# Patient Record
Sex: MALE
[De-identification: names, ages, dates, MRNs, and addresses within clinical notes are randomized per-mention and may not be internally consistent; named-entity substitution may affect disease eponyms.]

---

## 2017-01-31 ENCOUNTER — RESULT REVIEW (OUTPATIENT)
Age: 62
End: 2017-01-31

## 2017-11-10 ENCOUNTER — APPOINTMENT (OUTPATIENT)
Dept: NEUROLOGY | Facility: CLINIC | Age: 62
End: 2017-11-10

## 2017-11-13 ENCOUNTER — FORM ENCOUNTER (OUTPATIENT)
Age: 62
End: 2017-11-13

## 2017-11-14 ENCOUNTER — APPOINTMENT (OUTPATIENT)
Dept: NEUROLOGY | Facility: CLINIC | Age: 62
End: 2017-11-14
Payer: COMMERCIAL

## 2017-11-14 ENCOUNTER — APPOINTMENT (OUTPATIENT)
Dept: CT IMAGING | Facility: CLINIC | Age: 62
End: 2017-11-14
Payer: COMMERCIAL

## 2017-11-14 ENCOUNTER — OUTPATIENT (OUTPATIENT)
Dept: OUTPATIENT SERVICES | Facility: HOSPITAL | Age: 62
LOS: 1 days | End: 2017-11-14
Payer: COMMERCIAL

## 2017-11-14 VITALS — HEIGHT: 69 IN

## 2017-11-14 DIAGNOSIS — Z80.3 FAMILY HISTORY OF MALIGNANT NEOPLASM OF BREAST: ICD-10-CM

## 2017-11-14 DIAGNOSIS — Z95.2 PRESENCE OF PROSTHETIC HEART VALVE: Chronic | ICD-10-CM

## 2017-11-14 DIAGNOSIS — Z78.9 OTHER SPECIFIED HEALTH STATUS: ICD-10-CM

## 2017-11-14 DIAGNOSIS — Z96.659 PRESENCE OF UNSPECIFIED ARTIFICIAL KNEE JOINT: Chronic | ICD-10-CM

## 2017-11-14 DIAGNOSIS — I65.21 OCCLUSION AND STENOSIS OF RIGHT CAROTID ARTERY: ICD-10-CM

## 2017-11-14 DIAGNOSIS — Z80.41 FAMILY HISTORY OF MALIGNANT NEOPLASM OF OVARY: ICD-10-CM

## 2017-11-14 DIAGNOSIS — Z81.8 FAMILY HISTORY OF OTHER MENTAL AND BEHAVIORAL DISORDERS: ICD-10-CM

## 2017-11-14 DIAGNOSIS — I25.10 ATHEROSCLEROTIC HEART DISEASE OF NATIVE CORONARY ARTERY WITHOUT ANGINA PECTORIS: Chronic | ICD-10-CM

## 2017-11-14 DIAGNOSIS — Z82.49 FAMILY HISTORY OF ISCHEMIC HEART DISEASE AND OTHER DISEASES OF THE CIRCULATORY SYSTEM: ICD-10-CM

## 2017-11-14 PROCEDURE — 70498 CT ANGIOGRAPHY NECK: CPT | Mod: 26

## 2017-11-14 PROCEDURE — 82565 ASSAY OF CREATININE: CPT

## 2017-11-14 PROCEDURE — 70496 CT ANGIOGRAPHY HEAD: CPT | Mod: 26

## 2017-11-14 PROCEDURE — 70496 CT ANGIOGRAPHY HEAD: CPT

## 2017-11-14 PROCEDURE — 70498 CT ANGIOGRAPHY NECK: CPT

## 2017-11-14 PROCEDURE — 99245 OFF/OP CONSLTJ NEW/EST HI 55: CPT

## 2017-11-14 RX ORDER — VALSARTAN AND HYDROCHLOROTHIAZIDE 160; 12.5 MG/1; MG/1
160-12.5 TABLET, FILM COATED ORAL
Qty: 90 | Refills: 0 | Status: DISCONTINUED | COMMUNITY
Start: 2017-05-24 | End: 2017-11-14

## 2017-11-14 RX ORDER — ALPRAZOLAM 0.5 MG/1
0.5 TABLET ORAL
Qty: 120 | Refills: 0 | Status: DISCONTINUED | COMMUNITY
Start: 2017-07-13 | End: 2017-11-14

## 2017-11-14 RX ORDER — PANTOPRAZOLE 40 MG/1
40 TABLET, DELAYED RELEASE ORAL
Qty: 30 | Refills: 0 | Status: DISCONTINUED | COMMUNITY
Start: 2017-10-05 | End: 2017-11-14

## 2017-11-14 RX ORDER — CLINDAMYCIN HYDROCHLORIDE 300 MG/1
300 CAPSULE ORAL
Qty: 28 | Refills: 0 | Status: DISCONTINUED | COMMUNITY
Start: 2017-10-20 | End: 2017-11-14

## 2017-11-14 RX ORDER — SILDENAFIL CITRATE 100 MG/1
100 TABLET, FILM COATED ORAL
Qty: 6 | Refills: 0 | Status: ACTIVE | COMMUNITY
Start: 2017-09-06

## 2017-11-21 ENCOUNTER — APPOINTMENT (OUTPATIENT)
Dept: VASCULAR SURGERY | Facility: CLINIC | Age: 62
End: 2017-11-21
Payer: COMMERCIAL

## 2017-11-21 VITALS
HEART RATE: 68 BPM | TEMPERATURE: 98 F | SYSTOLIC BLOOD PRESSURE: 131 MMHG | BODY MASS INDEX: 35.55 KG/M2 | WEIGHT: 240 LBS | DIASTOLIC BLOOD PRESSURE: 88 MMHG | HEIGHT: 69 IN

## 2017-11-21 DIAGNOSIS — Z86.69 PERSONAL HISTORY OF OTHER DISEASES OF THE NERVOUS SYSTEM AND SENSE ORGANS: ICD-10-CM

## 2017-11-21 DIAGNOSIS — Z82.49 FAMILY HISTORY OF ISCHEMIC HEART DISEASE AND OTHER DISEASES OF THE CIRCULATORY SYSTEM: ICD-10-CM

## 2017-11-21 DIAGNOSIS — Z86.79 PERSONAL HISTORY OF OTHER DISEASES OF THE CIRCULATORY SYSTEM: ICD-10-CM

## 2017-11-21 DIAGNOSIS — Z80.8 FAMILY HISTORY OF MALIGNANT NEOPLASM OF OTHER ORGANS OR SYSTEMS: ICD-10-CM

## 2017-11-21 DIAGNOSIS — E78.00 PURE HYPERCHOLESTEROLEMIA, UNSPECIFIED: ICD-10-CM

## 2017-11-21 DIAGNOSIS — Z87.891 PERSONAL HISTORY OF NICOTINE DEPENDENCE: ICD-10-CM

## 2017-11-21 PROCEDURE — 99202 OFFICE O/P NEW SF 15 MIN: CPT | Mod: 25

## 2017-11-30 ENCOUNTER — APPOINTMENT (OUTPATIENT)
Dept: NEUROLOGY | Facility: CLINIC | Age: 62
End: 2017-11-30
Payer: COMMERCIAL

## 2017-11-30 PROCEDURE — 95937 NEUROMUSCULAR JUNCTION TEST: CPT

## 2017-11-30 PROCEDURE — 95933 BLINK REFLEX TEST: CPT

## 2017-11-30 PROCEDURE — 95909 NRV CNDJ TST 5-6 STUDIES: CPT

## 2017-12-01 ENCOUNTER — OUTPATIENT (OUTPATIENT)
Dept: OUTPATIENT SERVICES | Facility: HOSPITAL | Age: 62
LOS: 1 days | End: 2017-12-01
Payer: COMMERCIAL

## 2017-12-01 VITALS
DIASTOLIC BLOOD PRESSURE: 83 MMHG | WEIGHT: 246.92 LBS | SYSTOLIC BLOOD PRESSURE: 124 MMHG | TEMPERATURE: 98 F | HEART RATE: 71 BPM | RESPIRATION RATE: 16 BRPM | HEIGHT: 69 IN | OXYGEN SATURATION: 96 %

## 2017-12-01 DIAGNOSIS — I65.22 OCCLUSION AND STENOSIS OF LEFT CAROTID ARTERY: ICD-10-CM

## 2017-12-01 DIAGNOSIS — Z01.818 ENCOUNTER FOR OTHER PREPROCEDURAL EXAMINATION: ICD-10-CM

## 2017-12-01 DIAGNOSIS — I10 ESSENTIAL (PRIMARY) HYPERTENSION: ICD-10-CM

## 2017-12-01 DIAGNOSIS — Z96.659 PRESENCE OF UNSPECIFIED ARTIFICIAL KNEE JOINT: Chronic | ICD-10-CM

## 2017-12-01 DIAGNOSIS — G47.33 OBSTRUCTIVE SLEEP APNEA (ADULT) (PEDIATRIC): ICD-10-CM

## 2017-12-01 DIAGNOSIS — I65.21 OCCLUSION AND STENOSIS OF RIGHT CAROTID ARTERY: ICD-10-CM

## 2017-12-01 DIAGNOSIS — I25.10 ATHEROSCLEROTIC HEART DISEASE OF NATIVE CORONARY ARTERY WITHOUT ANGINA PECTORIS: Chronic | ICD-10-CM

## 2017-12-01 DIAGNOSIS — Z95.2 PRESENCE OF PROSTHETIC HEART VALVE: Chronic | ICD-10-CM

## 2017-12-01 LAB
ANION GAP SERPL CALC-SCNC: 18 MMOL/L — HIGH (ref 5–17)
BLD GP AB SCN SERPL QL: NEGATIVE — SIGNIFICANT CHANGE UP
BUN SERPL-MCNC: 16 MG/DL — SIGNIFICANT CHANGE UP (ref 7–23)
CALCIUM SERPL-MCNC: 10.1 MG/DL — SIGNIFICANT CHANGE UP (ref 8.4–10.5)
CHLORIDE SERPL-SCNC: 99 MMOL/L — SIGNIFICANT CHANGE UP (ref 96–108)
CO2 SERPL-SCNC: 24 MMOL/L — SIGNIFICANT CHANGE UP (ref 22–31)
CREAT SERPL-MCNC: 0.82 MG/DL — SIGNIFICANT CHANGE UP (ref 0.5–1.3)
GLUCOSE SERPL-MCNC: 100 MG/DL — HIGH (ref 70–99)
HCT VFR BLD CALC: 42.8 % — SIGNIFICANT CHANGE UP (ref 39–50)
HGB BLD-MCNC: 15 G/DL — SIGNIFICANT CHANGE UP (ref 13–17)
MCHC RBC-ENTMCNC: 33.6 PG — SIGNIFICANT CHANGE UP (ref 27–34)
MCHC RBC-ENTMCNC: 35 GM/DL — SIGNIFICANT CHANGE UP (ref 32–36)
MCV RBC AUTO: 95.7 FL — SIGNIFICANT CHANGE UP (ref 80–100)
PLATELET # BLD AUTO: 182 K/UL — SIGNIFICANT CHANGE UP (ref 150–400)
POTASSIUM SERPL-MCNC: 4.5 MMOL/L — SIGNIFICANT CHANGE UP (ref 3.5–5.3)
POTASSIUM SERPL-SCNC: 4.5 MMOL/L — SIGNIFICANT CHANGE UP (ref 3.5–5.3)
RBC # BLD: 4.47 M/UL — SIGNIFICANT CHANGE UP (ref 4.2–5.8)
RBC # FLD: 12.9 % — SIGNIFICANT CHANGE UP (ref 10.3–14.5)
RH IG SCN BLD-IMP: POSITIVE — SIGNIFICANT CHANGE UP
SODIUM SERPL-SCNC: 141 MMOL/L — SIGNIFICANT CHANGE UP (ref 135–145)
WBC # BLD: 7.1 K/UL — SIGNIFICANT CHANGE UP (ref 3.8–10.5)
WBC # FLD AUTO: 7.1 K/UL — SIGNIFICANT CHANGE UP (ref 3.8–10.5)

## 2017-12-01 PROCEDURE — 85027 COMPLETE CBC AUTOMATED: CPT

## 2017-12-01 PROCEDURE — G0463: CPT

## 2017-12-01 PROCEDURE — 80048 BASIC METABOLIC PNL TOTAL CA: CPT

## 2017-12-01 PROCEDURE — 86900 BLOOD TYPING SEROLOGIC ABO: CPT

## 2017-12-01 PROCEDURE — 86850 RBC ANTIBODY SCREEN: CPT

## 2017-12-01 PROCEDURE — 86901 BLOOD TYPING SEROLOGIC RH(D): CPT

## 2017-12-01 RX ORDER — VANCOMYCIN HCL 1 G
1500 VIAL (EA) INTRAVENOUS ONCE
Qty: 0 | Refills: 0 | Status: DISCONTINUED | OUTPATIENT
Start: 2017-12-07 | End: 2017-12-08

## 2017-12-01 RX ORDER — LIDOCAINE HCL 20 MG/ML
0.2 VIAL (ML) INJECTION ONCE
Qty: 0 | Refills: 0 | Status: DISCONTINUED | OUTPATIENT
Start: 2017-12-07 | End: 2017-12-08

## 2017-12-01 NOTE — H&P PST ADULT - HISTORY OF PRESENT ILLNESS
61 yo male, PMH CAD s/p CABG X 3 2009, aortic stenosis s/p AVR with Bovine valve 2013, a-fib with cardiac ablation 2016 on Eliquis and aspirin. Pt. reports having lightheadedness and double vision, had MRA which revealed right carotid stenosis. Pt. presents to PST today for Right Carotid Endarterectomy on 12/7/17. Pt. denies syncope, recent fever, chills, chest pain, SOB, changes in bowel/urinary habits.  Pt. saw cardiologist on 11/27/17 for cardiac evaluation, he will hold Eliquis 2 days pre-op, last dose 12/4/17 and continue aspirin during blair-op period.  EKG, Echo, cardiac cath from this year in chart

## 2017-12-01 NOTE — H&P PST ADULT - LAST CARDIAC ANGIOGRAM/IMAGING
10/5/17 non-obstructive LAD disease with atretic LIMA to LAD, previously known RCA occlusion with patent SVG to RPDA, patent SVG to OM - report in chart

## 2017-12-01 NOTE — H&P PST ADULT - PMH
Gout    Hyperlipidemia    Hypertension A-fib  s/p cardiac ablation 3/2016  Aortic stenosis  s/p AVR - Bovine 2013  CAD (coronary artery disease)  s/p CABG x 3 2009 at [facility]  Gout    Hyperlipidemia    Hypertension    Obesity  BMI 36.5 on 12/1/17  Stenosis of right carotid artery

## 2017-12-01 NOTE — H&P PST ADULT - PROBLEM SELECTOR PLAN 1
Right Carotid Endarterectomy  Pre-op education, including Chlorhexidine soap, provided - all questions answered  Pt. will hold Eliquis 2 days pre-op and continue aspirin during blair-op period

## 2017-12-01 NOTE — H&P PST ADULT - ATTENDING COMMENTS
Patient has  severe  80% asymptomatic right  carotid stenosisi  right  CEA discussed  risks benefits  discussed  Patient  wants  this  procedure   performed

## 2017-12-01 NOTE — H&P PST ADULT - OTHER CARE PROVIDERS
Dr. Reyes, cardiology, [phone], Dr. Tovar EPS, [phone], Dr. Coleman,  , Dr. Jake Gonsales, ortho, [phone]

## 2017-12-01 NOTE — H&P PST ADULT - PSH
3-vessel coronary artery disease  CBGE  H/O aortic valve replacement    S/P TKR (total knee replacement) 3-vessel coronary artery disease  CABG x 3 on 8/2009  H/O aortic valve replacement  bovine, 9/26/2013  S/P TKR (total knee replacement)  right, 8/2011

## 2017-12-01 NOTE — H&P PST ADULT - ACTIVITY
Gym, cardio, weights, for one hour - 3 times a week Gym at least 3 times a week: cardio, weights for one hour

## 2017-12-07 ENCOUNTER — INPATIENT (INPATIENT)
Facility: HOSPITAL | Age: 62
LOS: 0 days | Discharge: HOME | DRG: 39 | End: 2017-12-08
Attending: SURGERY | Admitting: SURGERY
Payer: COMMERCIAL

## 2017-12-07 ENCOUNTER — APPOINTMENT (OUTPATIENT)
Dept: VASCULAR SURGERY | Facility: HOSPITAL | Age: 62
End: 2017-12-07

## 2017-12-07 ENCOUNTER — RESULT REVIEW (OUTPATIENT)
Age: 62
End: 2017-12-07

## 2017-12-07 ENCOUNTER — TRANSCRIPTION ENCOUNTER (OUTPATIENT)
Age: 62
End: 2017-12-07

## 2017-12-07 VITALS
RESPIRATION RATE: 18 BRPM | HEART RATE: 65 BPM | OXYGEN SATURATION: 96 % | HEIGHT: 69 IN | TEMPERATURE: 98 F | SYSTOLIC BLOOD PRESSURE: 119 MMHG | DIASTOLIC BLOOD PRESSURE: 81 MMHG | WEIGHT: 246.92 LBS

## 2017-12-07 DIAGNOSIS — I65.22 OCCLUSION AND STENOSIS OF LEFT CAROTID ARTERY: ICD-10-CM

## 2017-12-07 DIAGNOSIS — I65.21 OCCLUSION AND STENOSIS OF RIGHT CAROTID ARTERY: ICD-10-CM

## 2017-12-07 DIAGNOSIS — Z96.659 PRESENCE OF UNSPECIFIED ARTIFICIAL KNEE JOINT: Chronic | ICD-10-CM

## 2017-12-07 DIAGNOSIS — Z95.2 PRESENCE OF PROSTHETIC HEART VALVE: Chronic | ICD-10-CM

## 2017-12-07 DIAGNOSIS — I25.10 ATHEROSCLEROTIC HEART DISEASE OF NATIVE CORONARY ARTERY WITHOUT ANGINA PECTORIS: Chronic | ICD-10-CM

## 2017-12-07 LAB — RH IG SCN BLD-IMP: POSITIVE — SIGNIFICANT CHANGE UP

## 2017-12-07 PROCEDURE — 88305 TISSUE EXAM BY PATHOLOGIST: CPT | Mod: 26

## 2017-12-07 PROCEDURE — 35301 RECHANNELING OF ARTERY: CPT | Mod: GC

## 2017-12-07 PROCEDURE — 88311 DECALCIFY TISSUE: CPT | Mod: 26

## 2017-12-07 PROCEDURE — 88304 TISSUE EXAM BY PATHOLOGIST: CPT | Mod: 26

## 2017-12-07 RX ORDER — HYDROMORPHONE HYDROCHLORIDE 2 MG/ML
0.5 INJECTION INTRAMUSCULAR; INTRAVENOUS; SUBCUTANEOUS
Qty: 0 | Refills: 0 | Status: DISCONTINUED | OUTPATIENT
Start: 2017-12-07 | End: 2017-12-08

## 2017-12-07 RX ORDER — SODIUM CHLORIDE 9 MG/ML
500 INJECTION, SOLUTION INTRAVENOUS ONCE
Qty: 0 | Refills: 0 | Status: COMPLETED | OUTPATIENT
Start: 2017-12-07 | End: 2017-12-07

## 2017-12-07 RX ORDER — METOPROLOL TARTRATE 50 MG
150 TABLET ORAL DAILY
Qty: 0 | Refills: 0 | Status: DISCONTINUED | OUTPATIENT
Start: 2017-12-07 | End: 2017-12-08

## 2017-12-07 RX ORDER — DEXTROSE MONOHYDRATE, SODIUM CHLORIDE, AND POTASSIUM CHLORIDE 50; .745; 4.5 G/1000ML; G/1000ML; G/1000ML
1000 INJECTION, SOLUTION INTRAVENOUS
Qty: 0 | Refills: 0 | Status: DISCONTINUED | OUTPATIENT
Start: 2017-12-07 | End: 2017-12-08

## 2017-12-07 RX ORDER — HEPARIN SODIUM 5000 [USP'U]/ML
5000 INJECTION INTRAVENOUS; SUBCUTANEOUS EVERY 8 HOURS
Qty: 0 | Refills: 0 | Status: DISCONTINUED | OUTPATIENT
Start: 2017-12-07 | End: 2017-12-08

## 2017-12-07 RX ORDER — OXYCODONE AND ACETAMINOPHEN 5; 325 MG/1; MG/1
2 TABLET ORAL EVERY 6 HOURS
Qty: 0 | Refills: 0 | Status: DISCONTINUED | OUTPATIENT
Start: 2017-12-07 | End: 2017-12-08

## 2017-12-07 RX ORDER — PHENYLEPHRINE HYDROCHLORIDE 10 MG/ML
0.5 INJECTION INTRAVENOUS
Qty: 80 | Refills: 0 | Status: DISCONTINUED | OUTPATIENT
Start: 2017-12-07 | End: 2017-12-08

## 2017-12-07 RX ORDER — ACETAMINOPHEN 500 MG
1000 TABLET ORAL ONCE
Qty: 0 | Refills: 0 | Status: COMPLETED | OUTPATIENT
Start: 2017-12-07 | End: 2017-12-07

## 2017-12-07 RX ORDER — ONDANSETRON 8 MG/1
4 TABLET, FILM COATED ORAL ONCE
Qty: 0 | Refills: 0 | Status: DISCONTINUED | OUTPATIENT
Start: 2017-12-07 | End: 2017-12-08

## 2017-12-07 RX ORDER — ALLOPURINOL 300 MG
300 TABLET ORAL DAILY
Qty: 0 | Refills: 0 | Status: DISCONTINUED | OUTPATIENT
Start: 2017-12-07 | End: 2017-12-08

## 2017-12-07 RX ORDER — METOPROLOL TARTRATE 50 MG
150 TABLET ORAL DAILY
Qty: 0 | Refills: 0 | Status: DISCONTINUED | OUTPATIENT
Start: 2017-12-07 | End: 2017-12-07

## 2017-12-07 RX ORDER — ATORVASTATIN CALCIUM 80 MG/1
20 TABLET, FILM COATED ORAL AT BEDTIME
Qty: 0 | Refills: 0 | Status: DISCONTINUED | OUTPATIENT
Start: 2017-12-07 | End: 2017-12-08

## 2017-12-07 RX ORDER — SODIUM CHLORIDE 9 MG/ML
3 INJECTION INTRAMUSCULAR; INTRAVENOUS; SUBCUTANEOUS EVERY 8 HOURS
Qty: 0 | Refills: 0 | Status: DISCONTINUED | OUTPATIENT
Start: 2017-12-07 | End: 2017-12-07

## 2017-12-07 RX ORDER — ASPIRIN/CALCIUM CARB/MAGNESIUM 324 MG
81 TABLET ORAL DAILY
Qty: 0 | Refills: 0 | Status: DISCONTINUED | OUTPATIENT
Start: 2017-12-08 | End: 2017-12-08

## 2017-12-07 RX ORDER — ASPIRIN/CALCIUM CARB/MAGNESIUM 324 MG
81 TABLET ORAL DAILY
Qty: 0 | Refills: 0 | Status: DISCONTINUED | OUTPATIENT
Start: 2017-12-07 | End: 2017-12-07

## 2017-12-07 RX ADMIN — HEPARIN SODIUM 5000 UNIT(S): 5000 INJECTION INTRAVENOUS; SUBCUTANEOUS at 22:36

## 2017-12-07 RX ADMIN — SODIUM CHLORIDE 1000 MILLILITER(S): 9 INJECTION, SOLUTION INTRAVENOUS at 17:46

## 2017-12-07 RX ADMIN — ATORVASTATIN CALCIUM 20 MILLIGRAM(S): 80 TABLET, FILM COATED ORAL at 22:36

## 2017-12-07 RX ADMIN — PHENYLEPHRINE HYDROCHLORIDE 21 MICROGRAM(S)/KG/MIN: 10 INJECTION INTRAVENOUS at 18:51

## 2017-12-07 RX ADMIN — HYDROMORPHONE HYDROCHLORIDE 0.5 MILLIGRAM(S): 2 INJECTION INTRAMUSCULAR; INTRAVENOUS; SUBCUTANEOUS at 21:01

## 2017-12-07 RX ADMIN — DEXTROSE MONOHYDRATE, SODIUM CHLORIDE, AND POTASSIUM CHLORIDE 75 MILLILITER(S): 50; .745; 4.5 INJECTION, SOLUTION INTRAVENOUS at 17:47

## 2017-12-07 RX ADMIN — SODIUM CHLORIDE 3 MILLILITER(S): 9 INJECTION INTRAMUSCULAR; INTRAVENOUS; SUBCUTANEOUS at 10:53

## 2017-12-07 RX ADMIN — HYDROMORPHONE HYDROCHLORIDE 0.5 MILLIGRAM(S): 2 INJECTION INTRAMUSCULAR; INTRAVENOUS; SUBCUTANEOUS at 20:58

## 2017-12-07 NOTE — BRIEF OPERATIVE NOTE - PROCEDURE
<<-----Click on this checkbox to enter Procedure Carotid endarterectomy  12/07/2017  Right  Active  [id number]

## 2017-12-07 NOTE — PROGRESS NOTE ADULT - ASSESSMENT
62y Male PMH carotid stenosis. s/p R Carotid endarterectomy, currently requiring Nik for decreased BP.    - Pain control  - Wean Nik as tolerated to goal SBP>110  - Re-eval at [facility]  - ASA, DVT ppx  - Continue CLD

## 2017-12-07 NOTE — PATIENT PROFILE ADULT. - PSH
3-vessel coronary artery disease  CABG x 3 on 8/2009  H/O aortic valve replacement  bovine, 9/26/2013  S/P TKR (total knee replacement)  right, 8/2011

## 2017-12-07 NOTE — PROGRESS NOTE ADULT - SUBJECTIVE AND OBJECTIVE BOX
Under GA right CEA with Vein Patch  under neuro monitoring  done No changes  No shunt  needed  Post extubation  moving  all 4 extremities following commands  Patient had  a severe  right  ICA asymptomatic  stenosis  CTA was done preop

## 2017-12-07 NOTE — PROGRESS NOTE ADULT - SUBJECTIVE AND OBJECTIVE BOX
Post-operative note    S: Patient underwent R CEA, tolerated procedure without  issue and sent to PACU.  Patient SBP slightly soft in PACU, running between 100-110. Patient asymptomatic, denies pain, lightheadedness, dizziness. Patient received 500cc LR bolus, with minimal increase in SBP. Patient started on Nik drip. At time of POC, pt on 0.238 Nik.    O:T(C): 36.5 (12-07-17 @ 16:10), Max: 36.5 (12-07-17 @ 16:10)  HR: 54 (12-07-17 @ 20:00) (45 - 67)  BP: 118/58 (12-07-17 @ 20:00) (95/51 - 122/63)  RR: 16 (12-07-17 @ 20:00) (12 - 16)  SpO2: 100% (12-07-17 @ 20:00) (92% - 100%)  Wt(kg): --             Gen: NAD  HEENT: Neck incision c/d/i. Soft, no palpable hematoma. No visible drainage.  Abd: Soft, nontender, nondistended  Ext: LLE vein harvest site c/d/i

## 2017-12-07 NOTE — PATIENT PROFILE ADULT. - PMH
A-fib  s/p cardiac ablation 3/2016  Aortic stenosis  s/p AVR - Bovine 2013  CAD (coronary artery disease)  s/p CABG x 3 2009 at [facility]  Gout    Hyperlipidemia    Hypertension    Obesity  BMI 36.5 on 12/1/17  Stenosis of right carotid artery

## 2017-12-08 ENCOUNTER — TRANSCRIPTION ENCOUNTER (OUTPATIENT)
Age: 62
End: 2017-12-08

## 2017-12-08 VITALS
TEMPERATURE: 98 F | OXYGEN SATURATION: 94 % | SYSTOLIC BLOOD PRESSURE: 101 MMHG | DIASTOLIC BLOOD PRESSURE: 65 MMHG | RESPIRATION RATE: 18 BRPM | HEART RATE: 73 BPM

## 2017-12-08 LAB
ANION GAP SERPL CALC-SCNC: 11 MMOL/L — SIGNIFICANT CHANGE UP (ref 5–17)
BUN SERPL-MCNC: 12 MG/DL — SIGNIFICANT CHANGE UP (ref 7–23)
CALCIUM SERPL-MCNC: 8.5 MG/DL — SIGNIFICANT CHANGE UP (ref 8.4–10.5)
CHLORIDE SERPL-SCNC: 100 MMOL/L — SIGNIFICANT CHANGE UP (ref 96–108)
CO2 SERPL-SCNC: 26 MMOL/L — SIGNIFICANT CHANGE UP (ref 22–31)
CREAT SERPL-MCNC: 0.75 MG/DL — SIGNIFICANT CHANGE UP (ref 0.5–1.3)
GLUCOSE SERPL-MCNC: 110 MG/DL — HIGH (ref 70–99)
HCT VFR BLD CALC: 40.5 % — SIGNIFICANT CHANGE UP (ref 39–50)
HGB BLD-MCNC: 14.4 G/DL — SIGNIFICANT CHANGE UP (ref 13–17)
MAGNESIUM SERPL-MCNC: 1.9 MG/DL — SIGNIFICANT CHANGE UP (ref 1.6–2.6)
MCHC RBC-ENTMCNC: 35.4 PG — HIGH (ref 27–34)
MCHC RBC-ENTMCNC: 35.7 GM/DL — SIGNIFICANT CHANGE UP (ref 32–36)
MCV RBC AUTO: 99.2 FL — SIGNIFICANT CHANGE UP (ref 80–100)
PHOSPHATE SERPL-MCNC: 2.7 MG/DL — SIGNIFICANT CHANGE UP (ref 2.5–4.5)
PLATELET # BLD AUTO: 155 K/UL — SIGNIFICANT CHANGE UP (ref 150–400)
POTASSIUM SERPL-MCNC: 3.8 MMOL/L — SIGNIFICANT CHANGE UP (ref 3.5–5.3)
POTASSIUM SERPL-SCNC: 3.8 MMOL/L — SIGNIFICANT CHANGE UP (ref 3.5–5.3)
RBC # BLD: 4.08 M/UL — LOW (ref 4.2–5.8)
RBC # FLD: 11.8 % — SIGNIFICANT CHANGE UP (ref 10.3–14.5)
SODIUM SERPL-SCNC: 137 MMOL/L — SIGNIFICANT CHANGE UP (ref 135–145)
WBC # BLD: 10.4 K/UL — SIGNIFICANT CHANGE UP (ref 3.8–10.5)
WBC # FLD AUTO: 10.4 K/UL — SIGNIFICANT CHANGE UP (ref 3.8–10.5)

## 2017-12-08 PROCEDURE — 83735 ASSAY OF MAGNESIUM: CPT

## 2017-12-08 PROCEDURE — 88304 TISSUE EXAM BY PATHOLOGIST: CPT

## 2017-12-08 PROCEDURE — 84100 ASSAY OF PHOSPHORUS: CPT

## 2017-12-08 PROCEDURE — 88311 DECALCIFY TISSUE: CPT

## 2017-12-08 PROCEDURE — C1889: CPT

## 2017-12-08 PROCEDURE — 86901 BLOOD TYPING SEROLOGIC RH(D): CPT

## 2017-12-08 PROCEDURE — 80048 BASIC METABOLIC PNL TOTAL CA: CPT

## 2017-12-08 PROCEDURE — C1769: CPT

## 2017-12-08 PROCEDURE — 85027 COMPLETE CBC AUTOMATED: CPT

## 2017-12-08 PROCEDURE — 86900 BLOOD TYPING SEROLOGIC ABO: CPT

## 2017-12-08 PROCEDURE — 88305 TISSUE EXAM BY PATHOLOGIST: CPT

## 2017-12-08 RX ORDER — METOPROLOL TARTRATE 50 MG
150 TABLET ORAL DAILY
Qty: 0 | Refills: 0 | Status: DISCONTINUED | OUTPATIENT
Start: 2017-12-08 | End: 2017-12-08

## 2017-12-08 RX ADMIN — OXYCODONE AND ACETAMINOPHEN 2 TABLET(S): 5; 325 TABLET ORAL at 10:48

## 2017-12-08 RX ADMIN — HEPARIN SODIUM 5000 UNIT(S): 5000 INJECTION INTRAVENOUS; SUBCUTANEOUS at 04:36

## 2017-12-08 RX ADMIN — Medication 81 MILLIGRAM(S): at 10:48

## 2017-12-08 RX ADMIN — OXYCODONE AND ACETAMINOPHEN 2 TABLET(S): 5; 325 TABLET ORAL at 05:07

## 2017-12-08 RX ADMIN — OXYCODONE AND ACETAMINOPHEN 2 TABLET(S): 5; 325 TABLET ORAL at 11:18

## 2017-12-08 RX ADMIN — OXYCODONE AND ACETAMINOPHEN 2 TABLET(S): 5; 325 TABLET ORAL at 04:37

## 2017-12-08 RX ADMIN — Medication 300 MILLIGRAM(S): at 10:48

## 2017-12-08 RX ADMIN — Medication 400 MILLIGRAM(S): at 00:00

## 2017-12-08 RX ADMIN — Medication 150 MILLIGRAM(S): at 10:48

## 2017-12-08 NOTE — DISCHARGE NOTE ADULT - NS AS DC STROKE ED MATERIALS
Call 911 for Stroke/Risk Factors for Stroke/Need for Followup After Discharge/Prescribed Medications/Stroke Warning Signs and Symptoms/Stroke Education Booklet

## 2017-12-08 NOTE — DISCHARGE NOTE ADULT - PLAN OF CARE
wound healing right neck , left groin Please call for follow-up appointment with Dr. Guajardo in one to two weeks. Continue home medications, regular diet, activity as tolerated. Avoid strenuous exercise and heavy lifting over 15 lbs for the next two weeks. Please do not drive until your pain is well controlled and you do not need to take oral pain medications.  You may shower but do not submerge or scrub incision sites.  Please pat dry incisions/dressings.  Leave the white steri strips in place on your neck - they will fall off on their own in approximately 5-7 days.   Return to ER for temperatures greater than 101, chills sweats, severe headache or pain not controlled with pain medications, persistent nausea, or any acutely concerning matters to you, that may require urgent medical attention. blood pressure control Please call [phone] for follow-up appointment with your Primary Care Provider, Dr. PERCY Becker to review details of this hospitalization and your current medications. Continue your regular diet, activity as tolerated. continue diet, medications to lower lipids Please call [phone] for follow-up appointment with your Primary Care Provider, Dr. PERCY Becker. Continue your current medications, regular diet, activity as tolerated. Please call [phone] for follow-up appointment with your cardiologist, , within one to two weeks to update your medical records regarding this hospitalization and to review all your current medications and dosages. Please call [phone] for follow-up appointment with your Primary Care Provider, Dr. PERCY Becker to review details of this hospitalization and your current medications. Continue your current medications, regular diet, activity as tolerated. continue diet, medications for heart disease

## 2017-12-08 NOTE — PROGRESS NOTE ADULT - SUBJECTIVE AND OBJECTIVE BOX
VASCULAR SURGERY PROGRESS NOTE (Pager: [phone])    Subjective: Patient doing well this morning; POD 1 s/p R CEA.  Patient SBP slightly soft, running between 100-120. Patient asymptomatic, denies pain, lightheadedness, dizziness. Patient received 500cc LR bolus, with minimal increase in SBP. Patient started on Nik drip. At time of POC, pt on 0.238 Nik.    Objective:    Physical exam:  Gen: NAD  HEENT: Neck incision c/d/i. Soft, no palpable hematoma. No visible drainage.  Abd: Soft, nontender, nondistended  Ext: LLE vein harvest site c/d/i      Vital Signs Last 24 Hrs  T(C): 36.9 (08 Dec 2017 04:26), Max: 37 (07 Dec 2017 23:00)  T(F): 98.5 (08 Dec 2017 04:26), Max: 98.6 (07 Dec 2017 23:00)  HR: 52 (08 Dec 2017 04:26) (45 - 67)  BP: 118/71 (08 Dec 2017 04:26) (95/51 - 124/80)  BP(mean): 79 (08 Dec 2017 00:30) (68 - 88)  RR: 18 (08 Dec 2017 04:26) (11 - 18)  SpO2: 98% (08 Dec 2017 04:26) (92% - 100%)    I&O's Detail    07 Dec 2017 07:01  -  08 Dec 2017 05:23  --------------------------------------------------------  IN:    dextrose 5% + sodium chloride 0.45% with potassium chloride 20 mEq/L: 750 mL    Oral Fluid: 350 mL    phenylephrine   Infusion: 15 mL  Total IN: 1115 mL    OUT:    Voided: 1100 mL  Total OUT: 1100 mL    Total NET: 15 mL    MEDICATIONS  (STANDING):  allopurinol 300 milliGRAM(s) Oral daily  aspirin enteric coated 81 milliGRAM(s) Oral daily  atorvastatin 20 milliGRAM(s) Oral at bedtime  dextrose 5% + sodium chloride 0.45% with potassium chloride 20 mEq/L 1000 milliLiter(s) (75 mL/Hr) IV Continuous <Continuous>  heparin  Injectable 5000 Unit(s) SubCutaneous every 8 hours  lidocaine 1% Injectable 0.2 milliLiter(s) Local Injection once  metoprolol succinate  milliGRAM(s) Oral daily  vancomycin  IVPB 1500 milliGRAM(s) IV Intermittent once    MEDICATIONS  (PRN):  oxyCODONE    5 mG/acetaminophen 325 mG 2 Tablet(s) Oral every 6 hours PRN Severe Pain      LABS: VASCULAR SURGERY PROGRESS NOTE (Pager: [phone])    Subjective: Patient doing well this morning; POD 1 s/p R CEA.  Patient SBP slightly soft, running between 100-120. Patient asymptomatic, denies pain, lightheadedness, dizziness. Patient received 500cc LR bolus, with minimal increase in SBP. Patient started on Nik drip.     Objective:    Physical exam:  Gen: NAD  HEENT: Neck incision c/d/i. Soft, no palpable hematoma. No visible drainage.  Abd: Soft, nontender, nondistended  Ext: LLE vein harvest site c/d/i      Vital Signs Last 24 Hrs  T(C): 36.9 (08 Dec 2017 04:26), Max: 37 (07 Dec 2017 23:00)  T(F): 98.5 (08 Dec 2017 04:26), Max: 98.6 (07 Dec 2017 23:00)  HR: 52 (08 Dec 2017 04:26) (45 - 67)  BP: 118/71 (08 Dec 2017 04:26) (95/51 - 124/80)  BP(mean): 79 (08 Dec 2017 00:30) (68 - 88)  RR: 18 (08 Dec 2017 04:26) (11 - 18)  SpO2: 98% (08 Dec 2017 04:26) (92% - 100%)    I&O's Detail    07 Dec 2017 07:01  -  08 Dec 2017 07:00  --------------------------------------------------------  IN:    dextrose 5% + sodium chloride 0.45% with potassium chloride 20 mEq/L: 750 mL    Oral Fluid: 350 mL    phenylephrine   Infusion: 15 mL  Total IN: 1115 mL    OUT:    Voided: 1100 mL  Total OUT: 1100 mL    Total NET: 15 mL          MEDICATIONS  (STANDING):  allopurinol 300 milliGRAM(s) Oral daily  aspirin enteric coated 81 milliGRAM(s) Oral daily  atorvastatin 20 milliGRAM(s) Oral at bedtime  dextrose 5% + sodium chloride 0.45% with potassium chloride 20 mEq/L 1000 milliLiter(s) (75 mL/Hr) IV Continuous <Continuous>  heparin  Injectable 5000 Unit(s) SubCutaneous every 8 hours  lidocaine 1% Injectable 0.2 milliLiter(s) Local Injection once  metoprolol succinate  milliGRAM(s) Oral daily  vancomycin  IVPB 1500 milliGRAM(s) IV Intermittent once    MEDICATIONS  (PRN):  oxyCODONE    5 mG/acetaminophen 325 mG 2 Tablet(s) Oral every 6 hours PRN Severe Pain      LABS:                        14.4   10.4  )-----------( 155      ( 08 Dec 2017 06:23 )             40.5       12-08    137  |  100  |  12  ----------------------------<  110<H>  3.8   |  26  |  0.75    Ca    8.5      08 Dec 2017 06:23  Phos  2.7     12-08  Mg     1.9     12-08

## 2017-12-08 NOTE — DISCHARGE NOTE ADULT - CARE PLAN
Atrial flutter, unspecified type Atrial flutter, unspecified type Other chronic pulmonary embolism Atrial flutter, unspecified type Hematoma of lower extremity, left, initial encounter Principal Discharge DX:	Stenosis of right carotid artery  Goal:	wound healing right neck , left groin  Instructions for follow-up, activity and diet:	Please call for follow-up appointment with Dr. Guajardo in one to two weeks. Continue home medications, regular diet, activity as tolerated. Avoid strenuous exercise and heavy lifting over 15 lbs for the next two weeks. Please do not drive until your pain is well controlled and you do not need to take oral pain medications.  You may shower but do not submerge or scrub incision sites.  Please pat dry incisions/dressings.  Leave the white steri strips in place on your neck - they will fall off on their own in approximately 5-7 days.   Return to ER for temperatures greater than 101, chills sweats, severe headache or pain not controlled with pain medications, persistent nausea, or any acutely concerning matters to you, that may require urgent medical attention.  Secondary Diagnosis:	Hypertension, unspecified type  Goal:	blood pressure control  Instructions for follow-up, activity and diet:	Please call [phone] for follow-up appointment with your Primary Care Provider, Dr. PERCY Becker to review details of this hospitalization and your current medications. Continue your regular diet, activity as tolerated.  Secondary Diagnosis:	Hyperlipidemia, unspecified hyperlipidemia type  Goal:	continue diet, medications to lower lipids  Instructions for follow-up, activity and diet:	Please call [phone] for follow-up appointment with your Primary Care Provider, Dr. PERCY Becker. Continue your current medications, regular diet, activity as tolerated.  Secondary Diagnosis:	CAD (coronary artery disease)  Instructions for follow-up, activity and diet:	Please call [phone] for follow-up appointment with your cardiologist, , within one to two weeks to update your medical records regarding this hospitalization and to review all your current medications and dosages. Principal Discharge DX:	Stenosis of right carotid artery  Goal:	wound healing right neck , left groin  Instructions for follow-up, activity and diet:	Please call for follow-up appointment with Dr. Guajardo in one to two weeks. Continue home medications, regular diet, activity as tolerated. Avoid strenuous exercise and heavy lifting over 15 lbs for the next two weeks. Please do not drive until your pain is well controlled and you do not need to take oral pain medications.  You may shower but do not submerge or scrub incision sites.  Please pat dry incisions/dressings.  Leave the white steri strips in place on your neck - they will fall off on their own in approximately 5-7 days.   Return to ER for temperatures greater than 101, chills sweats, severe headache or pain not controlled with pain medications, persistent nausea, or any acutely concerning matters to you, that may require urgent medical attention.  Secondary Diagnosis:	Hypertension, unspecified type  Goal:	blood pressure control  Instructions for follow-up, activity and diet:	Please call [phone] for follow-up appointment with your Primary Care Provider, Dr. PERCY Becker to review details of this hospitalization and your current medications. Continue your regular diet, activity as tolerated.  Secondary Diagnosis:	Hyperlipidemia, unspecified hyperlipidemia type  Goal:	continue diet, medications to lower lipids  Instructions for follow-up, activity and diet:	Please call [phone] for follow-up appointment with your Primary Care Provider, Dr. PERCY Becker to review details of this hospitalization and your current medications. Continue your current medications, regular diet, activity as tolerated.  Secondary Diagnosis:	CAD (coronary artery disease)  Goal:	continue diet, medications for heart disease  Instructions for follow-up, activity and diet:	Please call [phone] for follow-up appointment with your cardiologist, , within one to two weeks to update your medical records regarding this hospitalization and to review all your current medications and dosages.

## 2017-12-08 NOTE — DISCHARGE NOTE ADULT - HOSPITAL COURSE
61 yo male, PMH CAD s/p CABG X 3 2009, aortic stenosis s/p AVR with Bovine valve 2013, a-fib with cardiac ablation 2016 on Eliquis and aspirin. Pt. reports having lightheadedness and double vision, had MRA which revealed right carotid stenosis. Pt. presents to PST for planned Right Carotid Endarterectomy on 12/7/17. Pt. denies syncope, recent fever, chills, chest pain, SOB, changes in bowel/urinary habits.  Pt. saw cardiologist on 11/27/17 for cardiac evaluation, he will hold Eliquis 2 days pre-op, last dose 12/4/17 and continue aspirin during blair-op period.  EKG, Echo, cardiac cath from this year in chart 61 yo male, PMH CAD s/p CABG X 3 2009, aortic stenosis s/p AVR with Bovine valve 2013, a-fib with cardiac ablation 2016 on Eliquis and aspirin. Pt. reports having lightheadedness and double vision, had MRA which revealed right carotid stenosis. Pt. presents to PST for planned Right Carotid Endarterectomy on 12/7/17. Pt. denies syncope, recent fever, chills, chest pain, SOB, changes in bowel/urinary habits. Pt. saw cardiologist on 11/27/17 for cardiac evaluation, he will hold Eliquis 2 days pre-op, last dose 12/4/17 and continue aspirin during blair-op period. EKG, Echo, cardiac cath from this year in chart.      Patient admitted on 12/7/17 and underwent Right Carotid Endarterectomy with saphenous vein patch from right groin. Patient tolerated the procedure well, was extubated in the OR, then transferred to the PACU in stable condition. On post-op exam the patient was neurologically intact and subsequently transferred to a surgical floor. ON POD 1, the patient remained neurologically intact. Patient tolerated a regular diet, ambulated, and blood pressure remained stable. Pain controlled with percocet. Patient cleared for discharge to home with instructions for follow-up appointments, medications, and activity. 63 yo male, PMH CAD s/p CABG X 3 2009, aortic stenosis s/p AVR with Bovine valve 2013, a-fib with cardiac ablation 2016 on Eliquis and aspirin. Pt. reports having lightheadedness and double vision, had MRA which revealed right carotid stenosis. Pt. presents to PST for planned Right Carotid Endarterectomy for severe asymptomatic right carotid stenosis. Pt. denies syncope, recent fever, chills, chest pain, SOB, changes in bowel/urinary habits. Pt. saw cardiologist on 11/27/17 for cardiac evaluation. He will hold Eliquis 2 days pre-op, last dose 12/4/17 and continue aspirin during blair-op period. EKG, Echo, cardiac cath from this year in chart.      Patient admitted on 12/7/17 and underwent Right Carotid Endarterectomy with saphenous vein patch from right groin. Patient tolerated the procedure well, was extubated in the OR, then transferred to the PACU in stable condition. On post-op exam the patient was neurologically intact and subsequently transferred to a surgical floor. ON POD 1, the patient remained neurologically intact. Patient tolerated a regular diet, ambulated, and blood pressure remained stable. Pain controlled with percocet. Patient cleared for discharge to home with instructions for follow-up appointments, medications, and activity.

## 2017-12-08 NOTE — DISCHARGE NOTE ADULT - PATIENT PORTAL LINK FT
“You can access the FollowHealth Patient Portal, offered by [facility], by registering with the following website: [email or web address]”

## 2017-12-08 NOTE — DISCHARGE NOTE ADULT - CARE PROVIDER_API CALL
Dot Guajardo), Surgery; Surgical Critical Care; Vascular Surgery  [address]  [address]  [place] [address]  Phone: [phone]  Fax: [phone]

## 2017-12-08 NOTE — DISCHARGE NOTE ADULT - MEDICATION SUMMARY - MEDICATIONS TO TAKE
I will START or STAY ON the medications listed below when I get home from the hospital:    aspirin 81 mg oral delayed release tablet  -- 1 tab(s) by mouth once a day will continue during blair-op period  -- Indication: For CArdioprotection    allopurinol 300 mg oral tablet  -- 1 tab(s) by mouth once a day  -- Indication: For gout    Crestor 5 mg oral tablet  -- 1 tab(s) by mouth once a day (at bedtime)  -- Indication: For High cholesterol    valsartan-hydrochlorothiazide 160mg-12.5mg oral tablet  -- 1 tab(s) by mouth once a day  -- Indication: For Hypertension, unspecified type    zolpidem 5 mg oral tablet  -- 1 tab(s) by mouth once a day (at bedtime), As needed, Insomnia  -- Indication: For insomnia    Xanax 0.25 mg oral tablet  -- 1 tab(s) by mouth once a day, As Needed  -- Indication: For Anxiety    metoprolol  -- 150 milligram(s) by mouth once a day  -- Indication: For Hypertension, unspecified type I will START or STAY ON the medications listed below when I get home from the hospital:    oxyCODONE-acetaminophen 5 mg-325 mg oral tablet  -- 1-2 tab(s) by mouth every 6 hours, As needed, Moderate Pain (1 tab) or Severe Pain (2 tabs) MDD:8  -- Indication: For postop pain    aspirin 81 mg oral delayed release tablet  -- 1 tab(s) by mouth once a day will continue during blair-op period  -- Indication: For CArdioprotection    allopurinol 300 mg oral tablet  -- 1 tab(s) by mouth once a day  -- Indication: For gout    Crestor 5 mg oral tablet  -- 1 tab(s) by mouth once a day (at bedtime)  -- Indication: For High cholesterol    valsartan-hydrochlorothiazide 160mg-12.5mg oral tablet  -- 1 tab(s) by mouth once a day  -- Indication: For Hypertension, unspecified type    zolpidem 5 mg oral tablet  -- 1 tab(s) by mouth once a day (at bedtime), As needed, Insomnia  -- Indication: For insomnia    Xanax 0.25 mg oral tablet  -- 1 tab(s) by mouth once a day, As Needed  -- Indication: For Anxiety    metoprolol  -- 150 milligram(s) by mouth once a day  -- Indication: For Hypertension, unspecified type

## 2017-12-08 NOTE — DISCHARGE NOTE ADULT - NS AS ACTIVITY OBS
No Heavy lifting/straining/Stairs allowed/Walking-Indoors allowed/Do not drive or operate machinery/Showering allowed/Walking-Outdoors allowed

## 2017-12-08 NOTE — DISCHARGE NOTE ADULT - SECONDARY DIAGNOSIS.
Hypertension, unspecified type Hyperlipidemia, unspecified hyperlipidemia type CAD (coronary artery disease)

## 2017-12-08 NOTE — PROGRESS NOTE ADULT - ASSESSMENT
Assessment: 62y Male PMH carotid stenosis. s/p R Carotid endarterectomy.    Plan:  - Pain control  - Monitor BP; goal SBP>110  - ASA, DVT ppx  - Continue CLD, consider advancing to regular Assessment: 62y Male PMH carotid stenosis. s/p R Carotid endarterectomy is hemodynamically stable.    Plan:  - Pain control  - Monitor BP; goal SBP>110  - ASA, DVT ppx  - Advance to regular  - Likely discharge today

## 2017-12-08 NOTE — DISCHARGE NOTE ADULT - OTHER SIGNIFICANT FINDINGS
63 yo male, PMH CAD s/p CABG X 3 2009, aortic stenosis s/p AVR with Bovine valve 2013, a-fib with cardiac ablation 2016 on Eliquis and aspirin. Pt. reports having lightheadedness and double vision, had MRA which revealed right carotid stenosis. Pt. presents to PST today for Right Carotid Endarterectomy on 12/7/17. Pt. denies syncope, recent fever, chills, chest pain, SOB, changes in bowel/urinary habits.  Pt. saw cardiologist on 11/27/17 for cardiac evaluation, he will hold Eliquis 2 days pre-op, last dose 12/4/17 and continue aspirin during blair-op period.  EKG, Echo, cardiac cath from this year in chart 6

## 2017-12-14 LAB — SURGICAL PATHOLOGY STUDY: SIGNIFICANT CHANGE UP

## 2017-12-19 ENCOUNTER — APPOINTMENT (OUTPATIENT)
Dept: VASCULAR SURGERY | Facility: CLINIC | Age: 62
End: 2017-12-19
Payer: COMMERCIAL

## 2017-12-19 VITALS
HEART RATE: 68 BPM | TEMPERATURE: 97.7 F | SYSTOLIC BLOOD PRESSURE: 117 MMHG | WEIGHT: 245 LBS | DIASTOLIC BLOOD PRESSURE: 83 MMHG | BODY MASS INDEX: 36.29 KG/M2 | HEIGHT: 69 IN

## 2017-12-19 DIAGNOSIS — G89.18 OTHER ACUTE POSTPROCEDURAL PAIN: ICD-10-CM

## 2017-12-19 PROCEDURE — 99024 POSTOP FOLLOW-UP VISIT: CPT

## 2018-01-04 ENCOUNTER — APPOINTMENT (OUTPATIENT)
Dept: NEUROLOGY | Facility: CLINIC | Age: 63
End: 2018-01-04

## 2018-02-27 ENCOUNTER — APPOINTMENT (OUTPATIENT)
Dept: NEUROLOGY | Facility: CLINIC | Age: 63
End: 2018-02-27
Payer: COMMERCIAL

## 2018-02-27 VITALS
HEART RATE: 67 BPM | WEIGHT: 230 LBS | DIASTOLIC BLOOD PRESSURE: 85 MMHG | BODY MASS INDEX: 34.07 KG/M2 | HEIGHT: 69 IN | SYSTOLIC BLOOD PRESSURE: 128 MMHG

## 2018-02-27 PROCEDURE — 99215 OFFICE O/P EST HI 40 MIN: CPT

## 2018-02-27 RX ORDER — OXYCODONE AND ACETAMINOPHEN 5; 325 MG/1; MG/1
5-325 TABLET ORAL
Qty: 12 | Refills: 0 | Status: DISCONTINUED | COMMUNITY
Start: 2017-12-19 | End: 2018-02-27

## 2018-03-09 ENCOUNTER — OTHER (OUTPATIENT)
Age: 63
End: 2018-03-09

## 2018-03-20 ENCOUNTER — APPOINTMENT (OUTPATIENT)
Dept: VASCULAR SURGERY | Facility: CLINIC | Age: 63
End: 2018-03-20
Payer: COMMERCIAL

## 2018-03-20 VITALS
TEMPERATURE: 98.5 F | SYSTOLIC BLOOD PRESSURE: 123 MMHG | HEIGHT: 69 IN | WEIGHT: 235 LBS | BODY MASS INDEX: 34.8 KG/M2 | DIASTOLIC BLOOD PRESSURE: 85 MMHG | HEART RATE: 64 BPM

## 2018-03-20 PROCEDURE — 99213 OFFICE O/P EST LOW 20 MIN: CPT

## 2018-03-20 PROCEDURE — 93882 EXTRACRANIAL UNI/LTD STUDY: CPT

## 2018-03-20 PROCEDURE — 93880 EXTRACRANIAL BILAT STUDY: CPT

## 2018-04-10 ENCOUNTER — APPOINTMENT (OUTPATIENT)
Dept: OPHTHALMOLOGY | Facility: CLINIC | Age: 63
End: 2018-04-10
Payer: COMMERCIAL

## 2018-04-10 DIAGNOSIS — H50.52 EXOPHORIA: ICD-10-CM

## 2018-04-10 DIAGNOSIS — H43.399 OTHER VITREOUS OPACITIES, UNSPECIFIED EYE: ICD-10-CM

## 2018-04-10 DIAGNOSIS — Z71.9 COUNSELING, UNSPECIFIED: ICD-10-CM

## 2018-04-10 PROCEDURE — 99204 OFFICE O/P NEW MOD 45 MIN: CPT

## 2018-04-10 PROCEDURE — 92060 SENSORIMOTOR EXAMINATION: CPT

## 2018-04-10 RX ORDER — LEVOFLOXACIN 500 MG/1
500 TABLET, FILM COATED ORAL
Qty: 10 | Refills: 0 | Status: DISCONTINUED | COMMUNITY
Start: 2018-01-08 | End: 2018-04-10

## 2018-04-24 ENCOUNTER — APPOINTMENT (OUTPATIENT)
Dept: NEUROLOGY | Facility: CLINIC | Age: 63
End: 2018-04-24
Payer: COMMERCIAL

## 2018-04-24 VITALS
BODY MASS INDEX: 34.8 KG/M2 | SYSTOLIC BLOOD PRESSURE: 109 MMHG | DIASTOLIC BLOOD PRESSURE: 79 MMHG | WEIGHT: 235 LBS | HEART RATE: 74 BPM | HEIGHT: 69 IN

## 2018-04-24 DIAGNOSIS — Z86.69 PERSONAL HISTORY OF OTHER DISEASES OF THE NERVOUS SYSTEM AND SENSE ORGANS: ICD-10-CM

## 2018-04-24 DIAGNOSIS — R42 DIZZINESS AND GIDDINESS: ICD-10-CM

## 2018-04-24 DIAGNOSIS — Z86.79 PERSONAL HISTORY OF OTHER DISEASES OF THE CIRCULATORY SYSTEM: ICD-10-CM

## 2018-04-24 PROCEDURE — 99244 OFF/OP CNSLTJ NEW/EST MOD 40: CPT

## 2018-04-25 PROBLEM — Z86.69 H/O DIPLOPIA: Status: ACTIVE | Noted: 2018-04-25

## 2018-04-25 PROBLEM — R42 DIZZINESS: Status: ACTIVE | Noted: 2018-04-25

## 2018-04-25 PROBLEM — Z86.79 HISTORY OF CAROTID ARTERY STENOSIS: Status: RESOLVED | Noted: 2018-04-25 | Resolved: 2018-04-25

## 2018-06-19 ENCOUNTER — APPOINTMENT (OUTPATIENT)
Dept: NEUROLOGY | Facility: CLINIC | Age: 63
End: 2018-06-19
Payer: COMMERCIAL

## 2018-06-19 VITALS
DIASTOLIC BLOOD PRESSURE: 75 MMHG | HEART RATE: 60 BPM | WEIGHT: 235 LBS | BODY MASS INDEX: 34.8 KG/M2 | SYSTOLIC BLOOD PRESSURE: 116 MMHG | HEIGHT: 69 IN

## 2018-06-19 DIAGNOSIS — H53.2 DIPLOPIA: ICD-10-CM

## 2018-06-19 PROCEDURE — 93880 EXTRACRANIAL BILAT STUDY: CPT

## 2018-06-19 PROCEDURE — 93892 TCD EMBOLI DETECT W/O INJ: CPT

## 2018-06-19 PROCEDURE — 93886 INTRACRANIAL COMPLETE STUDY: CPT

## 2018-06-19 PROCEDURE — 99215 OFFICE O/P EST HI 40 MIN: CPT

## 2018-06-20 ENCOUNTER — OTHER (OUTPATIENT)
Age: 63
End: 2018-06-20

## 2018-10-09 ENCOUNTER — APPOINTMENT (OUTPATIENT)
Dept: NEUROLOGY | Facility: CLINIC | Age: 63
End: 2018-10-09
Payer: COMMERCIAL

## 2018-10-09 VITALS
SYSTOLIC BLOOD PRESSURE: 106 MMHG | HEART RATE: 72 BPM | HEIGHT: 69 IN | DIASTOLIC BLOOD PRESSURE: 77 MMHG | WEIGHT: 235 LBS | BODY MASS INDEX: 34.8 KG/M2

## 2018-10-09 DIAGNOSIS — I65.21 OCCLUSION AND STENOSIS OF RIGHT CAROTID ARTERY: ICD-10-CM

## 2018-10-09 DIAGNOSIS — G45.0 VERTEBRO-BASILAR ARTERY SYNDROME: ICD-10-CM

## 2018-10-09 PROBLEM — I25.10 ATHEROSCLEROTIC HEART DISEASE OF NATIVE CORONARY ARTERY WITHOUT ANGINA PECTORIS: Chronic | Status: ACTIVE | Noted: 2017-12-01

## 2018-10-09 PROBLEM — I35.0 NONRHEUMATIC AORTIC (VALVE) STENOSIS: Chronic | Status: ACTIVE | Noted: 2017-12-01

## 2018-10-09 PROBLEM — E66.9 OBESITY, UNSPECIFIED: Chronic | Status: ACTIVE | Noted: 2017-12-01

## 2018-10-09 PROBLEM — I48.91 UNSPECIFIED ATRIAL FIBRILLATION: Chronic | Status: ACTIVE | Noted: 2017-12-01

## 2018-10-09 PROCEDURE — 99215 OFFICE O/P EST HI 40 MIN: CPT

## 2018-10-09 RX ORDER — APIXABAN 5 MG/1
5 TABLET, FILM COATED ORAL
Refills: 0 | Status: ACTIVE | COMMUNITY

## 2018-10-23 ENCOUNTER — APPOINTMENT (OUTPATIENT)
Dept: NEUROLOGY | Facility: CLINIC | Age: 63
End: 2018-10-23

## 2018-11-12 ENCOUNTER — APPOINTMENT (OUTPATIENT)
Dept: NEUROLOGY | Facility: CLINIC | Age: 63
End: 2018-11-12

## 2019-01-14 ENCOUNTER — APPOINTMENT (OUTPATIENT)
Dept: NEUROLOGY | Facility: CLINIC | Age: 64
End: 2019-01-14

## 2019-03-21 ENCOUNTER — EMERGENCY (EMERGENCY)
Facility: HOSPITAL | Age: 64
LOS: 1 days | Discharge: ANOTHER HOSPITAL | End: 2019-03-21
Attending: EMERGENCY MEDICINE | Admitting: EMERGENCY MEDICINE
Payer: COMMERCIAL

## 2019-03-21 VITALS
OXYGEN SATURATION: 96 % | DIASTOLIC BLOOD PRESSURE: 75 MMHG | TEMPERATURE: 98 F | RESPIRATION RATE: 20 BRPM | SYSTOLIC BLOOD PRESSURE: 133 MMHG | HEART RATE: 130 BPM

## 2019-03-21 VITALS
TEMPERATURE: 98 F | SYSTOLIC BLOOD PRESSURE: 152 MMHG | DIASTOLIC BLOOD PRESSURE: 83 MMHG | WEIGHT: 240.08 LBS | RESPIRATION RATE: 24 BRPM | HEART RATE: 84 BPM | HEIGHT: 69 IN | OXYGEN SATURATION: 93 %

## 2019-03-21 DIAGNOSIS — Z95.2 PRESENCE OF PROSTHETIC HEART VALVE: Chronic | ICD-10-CM

## 2019-03-21 DIAGNOSIS — Z96.659 PRESENCE OF UNSPECIFIED ARTIFICIAL KNEE JOINT: Chronic | ICD-10-CM

## 2019-03-21 DIAGNOSIS — I25.10 ATHEROSCLEROTIC HEART DISEASE OF NATIVE CORONARY ARTERY WITHOUT ANGINA PECTORIS: Chronic | ICD-10-CM

## 2019-03-21 LAB
ALBUMIN SERPL ELPH-MCNC: 4.3 G/DL — SIGNIFICANT CHANGE UP (ref 3.3–5)
ALP SERPL-CCNC: 87 U/L — SIGNIFICANT CHANGE UP (ref 40–120)
ALT FLD-CCNC: 38 U/L — SIGNIFICANT CHANGE UP (ref 12–78)
ANION GAP SERPL CALC-SCNC: 10 MMOL/L — SIGNIFICANT CHANGE UP (ref 5–17)
APTT BLD: 30.3 SEC — SIGNIFICANT CHANGE UP (ref 28.5–37)
AST SERPL-CCNC: 25 U/L — SIGNIFICANT CHANGE UP (ref 15–37)
BASOPHILS # BLD AUTO: 0.01 K/UL — SIGNIFICANT CHANGE UP (ref 0–0.2)
BASOPHILS NFR BLD AUTO: 0.1 % — SIGNIFICANT CHANGE UP (ref 0–2)
BILIRUB SERPL-MCNC: 0.4 MG/DL — SIGNIFICANT CHANGE UP (ref 0.2–1.2)
BLD GP AB SCN SERPL QL: SIGNIFICANT CHANGE UP
BUN SERPL-MCNC: 21 MG/DL — SIGNIFICANT CHANGE UP (ref 7–23)
CALCIUM SERPL-MCNC: 9.5 MG/DL — SIGNIFICANT CHANGE UP (ref 8.5–10.1)
CHLORIDE SERPL-SCNC: 106 MMOL/L — SIGNIFICANT CHANGE UP (ref 96–108)
CK MB CFR SERPL CALC: 3.8 NG/ML — HIGH (ref 0–3.6)
CO2 SERPL-SCNC: 23 MMOL/L — SIGNIFICANT CHANGE UP (ref 22–31)
CREAT SERPL-MCNC: 0.89 MG/DL — SIGNIFICANT CHANGE UP (ref 0.5–1.3)
EOSINOPHIL # BLD AUTO: 0 K/UL — SIGNIFICANT CHANGE UP (ref 0–0.5)
EOSINOPHIL NFR BLD AUTO: 0 % — SIGNIFICANT CHANGE UP (ref 0–6)
GLUCOSE SERPL-MCNC: 125 MG/DL — HIGH (ref 70–99)
HCT VFR BLD CALC: 43.4 % — SIGNIFICANT CHANGE UP (ref 39–50)
HGB BLD-MCNC: 15.4 G/DL — SIGNIFICANT CHANGE UP (ref 13–17)
IMM GRANULOCYTES NFR BLD AUTO: 0.5 % — SIGNIFICANT CHANGE UP (ref 0–1.5)
INR BLD: 1.11 RATIO — SIGNIFICANT CHANGE UP (ref 0.88–1.16)
LYMPHOCYTES # BLD AUTO: 0.96 K/UL — LOW (ref 1–3.3)
LYMPHOCYTES # BLD AUTO: 6.9 % — LOW (ref 13–44)
MCHC RBC-ENTMCNC: 33 PG — SIGNIFICANT CHANGE UP (ref 27–34)
MCHC RBC-ENTMCNC: 35.5 GM/DL — SIGNIFICANT CHANGE UP (ref 32–36)
MCV RBC AUTO: 92.9 FL — SIGNIFICANT CHANGE UP (ref 80–100)
MONOCYTES # BLD AUTO: 1.09 K/UL — HIGH (ref 0–0.9)
MONOCYTES NFR BLD AUTO: 7.8 % — SIGNIFICANT CHANGE UP (ref 2–14)
NEUTROPHILS # BLD AUTO: 11.79 K/UL — HIGH (ref 1.8–7.4)
NEUTROPHILS NFR BLD AUTO: 84.7 % — HIGH (ref 43–77)
NRBC # BLD: 0 /100 WBCS — SIGNIFICANT CHANGE UP (ref 0–0)
NT-PROBNP SERPL-SCNC: 375 PG/ML — HIGH (ref 0–125)
PLATELET # BLD AUTO: 224 K/UL — SIGNIFICANT CHANGE UP (ref 150–400)
POTASSIUM SERPL-MCNC: 3.8 MMOL/L — SIGNIFICANT CHANGE UP (ref 3.5–5.3)
POTASSIUM SERPL-SCNC: 3.8 MMOL/L — SIGNIFICANT CHANGE UP (ref 3.5–5.3)
PROT SERPL-MCNC: 8.1 G/DL — SIGNIFICANT CHANGE UP (ref 6–8.3)
PROTHROM AB SERPL-ACNC: 12.7 SEC — SIGNIFICANT CHANGE UP (ref 10–12.9)
RBC # BLD: 4.67 M/UL — SIGNIFICANT CHANGE UP (ref 4.2–5.8)
RBC # FLD: 12.7 % — SIGNIFICANT CHANGE UP (ref 10.3–14.5)
SODIUM SERPL-SCNC: 139 MMOL/L — SIGNIFICANT CHANGE UP (ref 135–145)
TROPONIN I SERPL-MCNC: 0.05 NG/ML — HIGH (ref 0.01–0.04)
WBC # BLD: 13.92 K/UL — HIGH (ref 3.8–10.5)
WBC # FLD AUTO: 13.92 K/UL — HIGH (ref 3.8–10.5)

## 2019-03-21 PROCEDURE — 85610 PROTHROMBIN TIME: CPT

## 2019-03-21 PROCEDURE — 84484 ASSAY OF TROPONIN QUANT: CPT

## 2019-03-21 PROCEDURE — 85730 THROMBOPLASTIN TIME PARTIAL: CPT

## 2019-03-21 PROCEDURE — 96375 TX/PRO/DX INJ NEW DRUG ADDON: CPT

## 2019-03-21 PROCEDURE — 71045 X-RAY EXAM CHEST 1 VIEW: CPT

## 2019-03-21 PROCEDURE — 96374 THER/PROPH/DIAG INJ IV PUSH: CPT

## 2019-03-21 PROCEDURE — 86850 RBC ANTIBODY SCREEN: CPT

## 2019-03-21 PROCEDURE — 99285 EMERGENCY DEPT VISIT HI MDM: CPT | Mod: 25

## 2019-03-21 PROCEDURE — 36415 COLL VENOUS BLD VENIPUNCTURE: CPT

## 2019-03-21 PROCEDURE — 85027 COMPLETE CBC AUTOMATED: CPT

## 2019-03-21 PROCEDURE — 80053 COMPREHEN METABOLIC PANEL: CPT

## 2019-03-21 PROCEDURE — 86900 BLOOD TYPING SEROLOGIC ABO: CPT

## 2019-03-21 PROCEDURE — 83880 ASSAY OF NATRIURETIC PEPTIDE: CPT

## 2019-03-21 PROCEDURE — 71045 X-RAY EXAM CHEST 1 VIEW: CPT | Mod: 26

## 2019-03-21 PROCEDURE — 82553 CREATINE MB FRACTION: CPT

## 2019-03-21 PROCEDURE — 86901 BLOOD TYPING SEROLOGIC RH(D): CPT

## 2019-03-21 RX ORDER — CLOPIDOGREL BISULFATE 75 MG/1
300 TABLET, FILM COATED ORAL ONCE
Qty: 0 | Refills: 0 | Status: COMPLETED | OUTPATIENT
Start: 2019-03-21 | End: 2019-03-21

## 2019-03-21 RX ORDER — METOPROLOL TARTRATE 50 MG
100 TABLET ORAL ONCE
Qty: 0 | Refills: 0 | Status: COMPLETED | OUTPATIENT
Start: 2019-03-21 | End: 2019-03-21

## 2019-03-21 RX ORDER — HEPARIN SODIUM 5000 [USP'U]/ML
4000 INJECTION INTRAVENOUS; SUBCUTANEOUS EVERY 6 HOURS
Qty: 0 | Refills: 0 | Status: DISCONTINUED | OUTPATIENT
Start: 2019-03-21 | End: 2019-03-25

## 2019-03-21 RX ORDER — HEPARIN SODIUM 5000 [USP'U]/ML
INJECTION INTRAVENOUS; SUBCUTANEOUS
Qty: 25000 | Refills: 0 | Status: DISCONTINUED | OUTPATIENT
Start: 2019-03-21 | End: 2019-03-25

## 2019-03-21 RX ORDER — ASPIRIN/CALCIUM CARB/MAGNESIUM 324 MG
243 TABLET ORAL ONCE
Qty: 0 | Refills: 0 | Status: COMPLETED | OUTPATIENT
Start: 2019-03-21 | End: 2019-03-21

## 2019-03-21 RX ORDER — HEPARIN SODIUM 5000 [USP'U]/ML
5000 INJECTION INTRAVENOUS; SUBCUTANEOUS ONCE
Qty: 0 | Refills: 0 | Status: COMPLETED | OUTPATIENT
Start: 2019-03-21 | End: 2019-03-21

## 2019-03-21 RX ORDER — MORPHINE SULFATE 50 MG/1
2 CAPSULE, EXTENDED RELEASE ORAL ONCE
Qty: 0 | Refills: 0 | Status: DISCONTINUED | OUTPATIENT
Start: 2019-03-21 | End: 2019-03-21

## 2019-03-21 RX ADMIN — MORPHINE SULFATE 2 MILLIGRAM(S): 50 CAPSULE, EXTENDED RELEASE ORAL at 23:33

## 2019-03-21 RX ADMIN — HEPARIN SODIUM 5000 UNIT(S): 5000 INJECTION INTRAVENOUS; SUBCUTANEOUS at 23:10

## 2019-03-21 RX ADMIN — HEPARIN SODIUM 1000 UNIT(S)/HR: 5000 INJECTION INTRAVENOUS; SUBCUTANEOUS at 23:11

## 2019-03-21 RX ADMIN — Medication 100 MILLIGRAM(S): at 23:33

## 2019-03-21 RX ADMIN — CLOPIDOGREL BISULFATE 300 MILLIGRAM(S): 75 TABLET, FILM COATED ORAL at 23:10

## 2019-03-21 RX ADMIN — Medication 243 MILLIGRAM(S): at 22:46

## 2019-03-21 NOTE — ED PROVIDER NOTE - NSRISKOFTRANSFER_ED_A_ED
Deterioration of Condition En Route/Transportation Risk (There is always a risk of traffic delays resulting in deterioration of condition.)/Death or Disability/Increased Pain

## 2019-03-21 NOTE — ED ADULT NURSE NOTE - PMH
A-fib  s/p cardiac ablation 3/2016  Aortic stenosis  s/p AVR - Bovine 2013  CAD (coronary artery disease)  s/p CABG x 3 2009 at [facility]  Gout    Hyperlipidemia    Hypertension    Obesity  BMI 36.5 on 12/1/17  Stenosis of right carotid artery

## 2019-03-21 NOTE — ED PROVIDER NOTE - PMH
A-fib  s/p cardiac ablation 3/2016  Aortic stenosis  s/p AVR - Bovine 2013  CAD (coronary artery disease)  s/p CABG x 3 2009 at [facility]  Gout    Hyperlipidemia    Hypertension    Obesity  BMI 36.5 on 12/1/17  Stenosis of right carotid artery

## 2019-03-21 NOTE — ED ADULT NURSE NOTE - OBJECTIVE STATEMENT
Pt. complaining of chest pain for 1 hour prior to ED arrival with shortness of breath. Pt. reported cardiac history. EKG obtained.

## 2019-03-21 NOTE — ED PROVIDER NOTE - ATTENDING CONTRIBUTION TO CARE
I have personally performed a face to face diagnostic evaluation on this patient.  I have reviewed the PA note and agree with the history, exam, and plan of care, except as noted.  History and Exam by me shows c/o chest pain, substernal, burning sensation, non radiating, started at 9:15pm after walking flight of stairs, and lied down, did not take his eloquis tonight, patient alert and oriented, heart sounds irregular and tacycardic, lungs clear, abdomen soft, non tender, f/u ekg, labs, chest xray, call cardiology Dr. Reyes.

## 2019-03-21 NOTE — ED PROVIDER NOTE - OBJECTIVE STATEMENT
64 y/o male hx of CAD s./p triple bypass in 2009, AVR 2009 with subsequent development of afib now on eliquis, htn and hld, who presents for acute onset of significant chest pressure about 2 horus ago. no radiation, no SOB. feels like prior MI's. otherwise in usual state of health. pain has improved significantly since onset. 64 y/o male hx of CAD s./p triple bypass in 2009, AVR 2009 with subsequent development of afib now on eliquis, htn and hld, who presents for acute onset of significant chest pressure about 2 hours ago. no radiation, no SOB. feels like prior MI's. otherwise in usual state of health. pain has improved significantly since onset.

## 2019-03-21 NOTE — ED PROVIDER NOTE - PROGRESS NOTE DETAILS
Dr Reyes arranging for transport to [place] Dr Reyes arranging for transfer to [place] spoke with Herbie, cardiac fellow at [place], case discussed, will arrange transfer to [place] called [facility] ER Dr. Davidson, case discuused, to be transferred ER to ER, awaiting ambulance

## 2019-04-09 ENCOUNTER — APPOINTMENT (OUTPATIENT)
Dept: VASCULAR SURGERY | Facility: CLINIC | Age: 64
End: 2019-04-09

## 2019-09-03 ENCOUNTER — OUTPATIENT (OUTPATIENT)
Dept: OUTPATIENT SERVICES | Facility: HOSPITAL | Age: 64
LOS: 1 days | End: 2019-09-03
Payer: MEDICARE

## 2019-09-03 VITALS
TEMPERATURE: 98 F | WEIGHT: 240.08 LBS | RESPIRATION RATE: 16 BRPM | HEART RATE: 68 BPM | OXYGEN SATURATION: 95 % | HEIGHT: 69 IN | SYSTOLIC BLOOD PRESSURE: 141 MMHG | DIASTOLIC BLOOD PRESSURE: 93 MMHG

## 2019-09-03 DIAGNOSIS — Z95.2 PRESENCE OF PROSTHETIC HEART VALVE: Chronic | ICD-10-CM

## 2019-09-03 DIAGNOSIS — M16.12 UNILATERAL PRIMARY OSTEOARTHRITIS, LEFT HIP: ICD-10-CM

## 2019-09-03 DIAGNOSIS — I25.10 ATHEROSCLEROTIC HEART DISEASE OF NATIVE CORONARY ARTERY WITHOUT ANGINA PECTORIS: Chronic | ICD-10-CM

## 2019-09-03 DIAGNOSIS — Z01.818 ENCOUNTER FOR OTHER PREPROCEDURAL EXAMINATION: ICD-10-CM

## 2019-09-03 DIAGNOSIS — Z98.890 OTHER SPECIFIED POSTPROCEDURAL STATES: Chronic | ICD-10-CM

## 2019-09-03 DIAGNOSIS — Z96.659 PRESENCE OF UNSPECIFIED ARTIFICIAL KNEE JOINT: Chronic | ICD-10-CM

## 2019-09-03 LAB
ANION GAP SERPL CALC-SCNC: 9 MMOL/L — SIGNIFICANT CHANGE UP (ref 5–17)
APTT BLD: 34 SEC — SIGNIFICANT CHANGE UP (ref 28.5–37)
BUN SERPL-MCNC: 19 MG/DL — SIGNIFICANT CHANGE UP (ref 7–23)
CALCIUM SERPL-MCNC: 9.4 MG/DL — SIGNIFICANT CHANGE UP (ref 8.5–10.1)
CHLORIDE SERPL-SCNC: 107 MMOL/L — SIGNIFICANT CHANGE UP (ref 96–108)
CO2 SERPL-SCNC: 24 MMOL/L — SIGNIFICANT CHANGE UP (ref 22–31)
CREAT SERPL-MCNC: 1.1 MG/DL — SIGNIFICANT CHANGE UP (ref 0.5–1.3)
GLUCOSE SERPL-MCNC: 108 MG/DL — HIGH (ref 70–99)
HBA1C BLD-MCNC: 5.8 % — HIGH (ref 4–5.6)
HCT VFR BLD CALC: 48.4 % — SIGNIFICANT CHANGE UP (ref 39–50)
HGB BLD-MCNC: 16.8 G/DL — SIGNIFICANT CHANGE UP (ref 13–17)
INR BLD: 1.34 RATIO — HIGH (ref 0.88–1.16)
MCHC RBC-ENTMCNC: 32 PG — SIGNIFICANT CHANGE UP (ref 27–34)
MCHC RBC-ENTMCNC: 34.7 GM/DL — SIGNIFICANT CHANGE UP (ref 32–36)
MCV RBC AUTO: 92.2 FL — SIGNIFICANT CHANGE UP (ref 80–100)
NRBC # BLD: 0 /100 WBCS — SIGNIFICANT CHANGE UP (ref 0–0)
PLATELET # BLD AUTO: 202 K/UL — SIGNIFICANT CHANGE UP (ref 150–400)
POTASSIUM SERPL-MCNC: 4.2 MMOL/L — SIGNIFICANT CHANGE UP (ref 3.5–5.3)
POTASSIUM SERPL-SCNC: 4.2 MMOL/L — SIGNIFICANT CHANGE UP (ref 3.5–5.3)
PROTHROM AB SERPL-ACNC: 15.3 SEC — HIGH (ref 10–12.9)
RBC # BLD: 5.25 M/UL — SIGNIFICANT CHANGE UP (ref 4.2–5.8)
RBC # FLD: 12.9 % — SIGNIFICANT CHANGE UP (ref 10.3–14.5)
SODIUM SERPL-SCNC: 140 MMOL/L — SIGNIFICANT CHANGE UP (ref 135–145)
WBC # BLD: 6.72 K/UL — SIGNIFICANT CHANGE UP (ref 3.8–10.5)
WBC # FLD AUTO: 6.72 K/UL — SIGNIFICANT CHANGE UP (ref 3.8–10.5)

## 2019-09-03 PROCEDURE — 85730 THROMBOPLASTIN TIME PARTIAL: CPT

## 2019-09-03 PROCEDURE — G0463: CPT

## 2019-09-03 PROCEDURE — 86850 RBC ANTIBODY SCREEN: CPT

## 2019-09-03 PROCEDURE — 71046 X-RAY EXAM CHEST 2 VIEWS: CPT | Mod: 26

## 2019-09-03 PROCEDURE — 87640 STAPH A DNA AMP PROBE: CPT

## 2019-09-03 PROCEDURE — 80048 BASIC METABOLIC PNL TOTAL CA: CPT

## 2019-09-03 PROCEDURE — 36415 COLL VENOUS BLD VENIPUNCTURE: CPT

## 2019-09-03 PROCEDURE — 73502 X-RAY EXAM HIP UNI 2-3 VIEWS: CPT

## 2019-09-03 PROCEDURE — 87641 MR-STAPH DNA AMP PROBE: CPT

## 2019-09-03 PROCEDURE — 85610 PROTHROMBIN TIME: CPT

## 2019-09-03 PROCEDURE — 83036 HEMOGLOBIN GLYCOSYLATED A1C: CPT

## 2019-09-03 PROCEDURE — 86901 BLOOD TYPING SEROLOGIC RH(D): CPT

## 2019-09-03 PROCEDURE — 73502 X-RAY EXAM HIP UNI 2-3 VIEWS: CPT | Mod: 26,LT

## 2019-09-03 PROCEDURE — 86900 BLOOD TYPING SEROLOGIC ABO: CPT

## 2019-09-03 PROCEDURE — 71046 X-RAY EXAM CHEST 2 VIEWS: CPT

## 2019-09-03 PROCEDURE — 85027 COMPLETE CBC AUTOMATED: CPT

## 2019-09-03 RX ORDER — METOPROLOL TARTRATE 50 MG
150 TABLET ORAL
Qty: 0 | Refills: 0 | DISCHARGE

## 2019-09-03 NOTE — H&P PST ADULT - NSICDXPASTSURGICALHX_GEN_ALL_CORE_FT
PAST SURGICAL HISTORY:  3-vessel coronary artery disease CABG x 3 on 8/2009    H/O aortic valve replacement bovine, 9/26/2013    H/O carotid endarterectomy right carotid    S/P TKR (total knee replacement) right, 8/2011

## 2019-09-03 NOTE — H&P PST ADULT - MUSCULOSKELETAL
details… detailed exam no joint warmth/decreased ROM due to pain/no joint erythema/no calf tenderness

## 2019-09-03 NOTE — H&P PST ADULT - HISTORY OF PRESENT ILLNESS
63 y/o male, PMH of HTN, Afib, LBBB, TAVR, Pacemaker 4/2019, with c/o left hip pain, bothering him for about 7 months. PT done for a while, takes Motrin or Tylenol for pain as needed. Had right knee replacement in 2011. Was seen by Dr Pavon about a month ago, has severe arthritis of left hip. Scheduled for left hip replacement. Pre op testing today.

## 2019-09-03 NOTE — H&P PST ADULT - CARDIOVASCULAR COMMENTS
TAVR and pacemaker 4/2019. Was thinking of Ablation for Afib, but started on Eliquis in the mean times

## 2019-09-03 NOTE — H&P PST ADULT - ASSESSMENT
63 y/o male, PMH of HTN, Afib, LBBB, TAVR, Pacemaker 4/2019, with c/o left hip pain, bothering him for about 7 months. PT done for a while, takes Motrin or Tylenol for pain as needed. Had right knee replacement in 2011. Was seen by Dr Pavon about a month ago, has severe arthritis of left hip. Scheduled for left hip replacement. Pre op testing today.  Medical and cardiac eval advised. Also needs pacemaker interrogation  within 6 months.

## 2019-09-03 NOTE — H&P PST ADULT - RS GEN PE MLT RESP DETAILS PC
respirations non-labored/clear to auscultation bilaterally/normal/airway patent/good air movement/breath sounds equal

## 2019-09-03 NOTE — H&P PST ADULT - NSICDXPROBLEM_GEN_ALL_CORE_FT
PROBLEM DIAGNOSES  Problem: Osteoarthritis of left hip  Assessment and Plan: Left total hip replacement.

## 2019-09-03 NOTE — H&P PST ADULT - NSICDXPASTMEDICALHX_GEN_ALL_CORE_FT
PAST MEDICAL HISTORY:  A-fib s/p cardiac ablation 3/2016    Aortic stenosis s/p AVR - Bovine 2013    CAD (coronary artery disease) s/p CABG x 3 2009 at [facility]    Gout     Hyperlipidemia     Hypertension     Obesity BMI 36.5 on 12/1/17    Stenosis of right carotid artery

## 2019-09-04 LAB
MRSA PCR RESULT.: SIGNIFICANT CHANGE UP
S AUREUS DNA NOSE QL NAA+PROBE: SIGNIFICANT CHANGE UP

## 2019-09-15 ENCOUNTER — TRANSCRIPTION ENCOUNTER (OUTPATIENT)
Age: 64
End: 2019-09-15

## 2019-09-16 ENCOUNTER — INPATIENT (INPATIENT)
Facility: HOSPITAL | Age: 64
LOS: 2 days | Discharge: HOME HEALTH | DRG: 470 | End: 2019-09-19
Attending: ORTHOPAEDIC SURGERY | Admitting: ORTHOPAEDIC SURGERY
Payer: MEDICARE

## 2019-09-16 ENCOUNTER — RESULT REVIEW (OUTPATIENT)
Age: 64
End: 2019-09-16

## 2019-09-16 VITALS
TEMPERATURE: 98 F | HEART RATE: 92 BPM | HEIGHT: 69 IN | RESPIRATION RATE: 14 BRPM | OXYGEN SATURATION: 95 % | SYSTOLIC BLOOD PRESSURE: 120 MMHG | WEIGHT: 240.08 LBS | DIASTOLIC BLOOD PRESSURE: 81 MMHG

## 2019-09-16 DIAGNOSIS — Z96.659 PRESENCE OF UNSPECIFIED ARTIFICIAL KNEE JOINT: Chronic | ICD-10-CM

## 2019-09-16 DIAGNOSIS — I35.0 NONRHEUMATIC AORTIC (VALVE) STENOSIS: ICD-10-CM

## 2019-09-16 DIAGNOSIS — Z95.2 PRESENCE OF PROSTHETIC HEART VALVE: Chronic | ICD-10-CM

## 2019-09-16 DIAGNOSIS — I10 ESSENTIAL (PRIMARY) HYPERTENSION: ICD-10-CM

## 2019-09-16 DIAGNOSIS — M16.12 UNILATERAL PRIMARY OSTEOARTHRITIS, LEFT HIP: ICD-10-CM

## 2019-09-16 DIAGNOSIS — Z98.890 OTHER SPECIFIED POSTPROCEDURAL STATES: Chronic | ICD-10-CM

## 2019-09-16 DIAGNOSIS — I48.91 UNSPECIFIED ATRIAL FIBRILLATION: ICD-10-CM

## 2019-09-16 DIAGNOSIS — E66.9 OBESITY, UNSPECIFIED: ICD-10-CM

## 2019-09-16 DIAGNOSIS — Z95.0 PRESENCE OF CARDIAC PACEMAKER: Chronic | ICD-10-CM

## 2019-09-16 DIAGNOSIS — I25.10 ATHEROSCLEROTIC HEART DISEASE OF NATIVE CORONARY ARTERY WITHOUT ANGINA PECTORIS: Chronic | ICD-10-CM

## 2019-09-16 LAB
HCT VFR BLD CALC: 35.5 % — LOW (ref 39–50)
HGB BLD-MCNC: 12 G/DL — LOW (ref 13–17)
MCHC RBC-ENTMCNC: 32.3 PG — SIGNIFICANT CHANGE UP (ref 27–34)
MCHC RBC-ENTMCNC: 33.8 GM/DL — SIGNIFICANT CHANGE UP (ref 32–36)
MCV RBC AUTO: 95.4 FL — SIGNIFICANT CHANGE UP (ref 80–100)
NRBC # BLD: 0 /100 WBCS — SIGNIFICANT CHANGE UP (ref 0–0)
PLATELET # BLD AUTO: 130 K/UL — LOW (ref 150–400)
RBC # BLD: 3.72 M/UL — LOW (ref 4.2–5.8)
RBC # FLD: 13.3 % — SIGNIFICANT CHANGE UP (ref 10.3–14.5)
WBC # BLD: 9.74 K/UL — SIGNIFICANT CHANGE UP (ref 3.8–10.5)
WBC # FLD AUTO: 9.74 K/UL — SIGNIFICANT CHANGE UP (ref 3.8–10.5)

## 2019-09-16 PROCEDURE — 88305 TISSUE EXAM BY PATHOLOGIST: CPT | Mod: 26

## 2019-09-16 PROCEDURE — 73501 X-RAY EXAM HIP UNI 1 VIEW: CPT | Mod: 26,LT

## 2019-09-16 PROCEDURE — 88311 DECALCIFY TISSUE: CPT | Mod: 26

## 2019-09-16 RX ORDER — HYDROMORPHONE HYDROCHLORIDE 2 MG/ML
0.5 INJECTION INTRAMUSCULAR; INTRAVENOUS; SUBCUTANEOUS
Refills: 0 | Status: DISCONTINUED | OUTPATIENT
Start: 2019-09-16 | End: 2019-09-16

## 2019-09-16 RX ORDER — METOPROLOL TARTRATE 50 MG
100 TABLET ORAL DAILY
Refills: 0 | Status: DISCONTINUED | OUTPATIENT
Start: 2019-09-16 | End: 2019-09-19

## 2019-09-16 RX ORDER — SODIUM CHLORIDE 9 MG/ML
1000 INJECTION, SOLUTION INTRAVENOUS
Refills: 0 | Status: DISCONTINUED | OUTPATIENT
Start: 2019-09-16 | End: 2019-09-16

## 2019-09-16 RX ORDER — ATORVASTATIN CALCIUM 80 MG/1
20 TABLET, FILM COATED ORAL AT BEDTIME
Refills: 0 | Status: DISCONTINUED | OUTPATIENT
Start: 2019-09-16 | End: 2019-09-19

## 2019-09-16 RX ORDER — APIXABAN 2.5 MG/1
1 TABLET, FILM COATED ORAL
Qty: 0 | Refills: 0 | DISCHARGE

## 2019-09-16 RX ORDER — OXYCODONE HYDROCHLORIDE 5 MG/1
10 TABLET ORAL ONCE
Refills: 0 | Status: DISCONTINUED | OUTPATIENT
Start: 2019-09-16 | End: 2019-09-16

## 2019-09-16 RX ORDER — METOPROLOL TARTRATE 50 MG
0 TABLET ORAL
Qty: 0 | Refills: 0 | DISCHARGE

## 2019-09-16 RX ORDER — ACETAMINOPHEN 500 MG
1000 TABLET ORAL ONCE
Refills: 0 | Status: COMPLETED | OUTPATIENT
Start: 2019-09-17 | End: 2019-09-17

## 2019-09-16 RX ORDER — ONDANSETRON 8 MG/1
4 TABLET, FILM COATED ORAL EVERY 6 HOURS
Refills: 0 | Status: DISCONTINUED | OUTPATIENT
Start: 2019-09-16 | End: 2019-09-19

## 2019-09-16 RX ORDER — ONDANSETRON 8 MG/1
4 TABLET, FILM COATED ORAL ONCE
Refills: 0 | Status: DISCONTINUED | OUTPATIENT
Start: 2019-09-16 | End: 2019-09-16

## 2019-09-16 RX ORDER — ASCORBIC ACID 60 MG
500 TABLET,CHEWABLE ORAL
Refills: 0 | Status: DISCONTINUED | OUTPATIENT
Start: 2019-09-16 | End: 2019-09-19

## 2019-09-16 RX ORDER — ASPIRIN/CALCIUM CARB/MAGNESIUM 324 MG
81 TABLET ORAL DAILY
Refills: 0 | Status: DISCONTINUED | OUTPATIENT
Start: 2019-09-16 | End: 2019-09-19

## 2019-09-16 RX ORDER — ASPIRIN/CALCIUM CARB/MAGNESIUM 324 MG
1 TABLET ORAL
Qty: 0 | Refills: 0 | DISCHARGE

## 2019-09-16 RX ORDER — HYDROMORPHONE HYDROCHLORIDE 2 MG/ML
2 INJECTION INTRAMUSCULAR; INTRAVENOUS; SUBCUTANEOUS EVERY 6 HOURS
Refills: 0 | Status: DISCONTINUED | OUTPATIENT
Start: 2019-09-16 | End: 2019-09-19

## 2019-09-16 RX ORDER — HYDROMORPHONE HYDROCHLORIDE 2 MG/ML
0.5 INJECTION INTRAMUSCULAR; INTRAVENOUS; SUBCUTANEOUS ONCE
Refills: 0 | Status: DISCONTINUED | OUTPATIENT
Start: 2019-09-16 | End: 2019-09-16

## 2019-09-16 RX ORDER — VALSARTAN 80 MG/1
1 TABLET ORAL
Qty: 0 | Refills: 0 | DISCHARGE

## 2019-09-16 RX ORDER — HYDROMORPHONE HYDROCHLORIDE 2 MG/ML
0.5 INJECTION INTRAMUSCULAR; INTRAVENOUS; SUBCUTANEOUS EVERY 4 HOURS
Refills: 0 | Status: DISCONTINUED | OUTPATIENT
Start: 2019-09-16 | End: 2019-09-16

## 2019-09-16 RX ORDER — CELECOXIB 200 MG/1
200 CAPSULE ORAL
Refills: 0 | Status: DISCONTINUED | OUTPATIENT
Start: 2019-09-16 | End: 2019-09-16

## 2019-09-16 RX ORDER — TRAMADOL HYDROCHLORIDE 50 MG/1
100 TABLET ORAL EVERY 8 HOURS
Refills: 0 | Status: DISCONTINUED | OUTPATIENT
Start: 2019-09-16 | End: 2019-09-16

## 2019-09-16 RX ORDER — HYDROMORPHONE HYDROCHLORIDE 2 MG/ML
2 INJECTION INTRAMUSCULAR; INTRAVENOUS; SUBCUTANEOUS EVERY 6 HOURS
Refills: 0 | Status: DISCONTINUED | OUTPATIENT
Start: 2019-09-16 | End: 2019-09-16

## 2019-09-16 RX ORDER — APIXABAN 2.5 MG/1
2.5 TABLET, FILM COATED ORAL
Refills: 0 | Status: COMPLETED | OUTPATIENT
Start: 2019-09-17 | End: 2019-09-17

## 2019-09-16 RX ORDER — DOCUSATE SODIUM 100 MG
100 CAPSULE ORAL THREE TIMES A DAY
Refills: 0 | Status: DISCONTINUED | OUTPATIENT
Start: 2019-09-16 | End: 2019-09-19

## 2019-09-16 RX ORDER — SODIUM CHLORIDE 9 MG/ML
1000 INJECTION, SOLUTION INTRAVENOUS
Refills: 0 | Status: DISCONTINUED | OUTPATIENT
Start: 2019-09-16 | End: 2019-09-17

## 2019-09-16 RX ORDER — HYDROMORPHONE HYDROCHLORIDE 2 MG/ML
0.5 INJECTION INTRAMUSCULAR; INTRAVENOUS; SUBCUTANEOUS EVERY 6 HOURS
Refills: 0 | Status: DISCONTINUED | OUTPATIENT
Start: 2019-09-16 | End: 2019-09-19

## 2019-09-16 RX ORDER — ALLOPURINOL 300 MG
300 TABLET ORAL DAILY
Refills: 0 | Status: DISCONTINUED | OUTPATIENT
Start: 2019-09-16 | End: 2019-09-19

## 2019-09-16 RX ORDER — OXYCODONE HYDROCHLORIDE 5 MG/1
5 TABLET ORAL ONCE
Refills: 0 | Status: DISCONTINUED | OUTPATIENT
Start: 2019-09-16 | End: 2019-09-16

## 2019-09-16 RX ORDER — ALPRAZOLAM 0.25 MG
0.25 TABLET ORAL DAILY
Refills: 0 | Status: DISCONTINUED | OUTPATIENT
Start: 2019-09-16 | End: 2019-09-19

## 2019-09-16 RX ORDER — TRAMADOL HYDROCHLORIDE 50 MG/1
50 TABLET ORAL EVERY 6 HOURS
Refills: 0 | Status: DISCONTINUED | OUTPATIENT
Start: 2019-09-16 | End: 2019-09-19

## 2019-09-16 RX ORDER — PANTOPRAZOLE SODIUM 20 MG/1
40 TABLET, DELAYED RELEASE ORAL
Refills: 0 | Status: DISCONTINUED | OUTPATIENT
Start: 2019-09-16 | End: 2019-09-19

## 2019-09-16 RX ORDER — ACETAMINOPHEN 500 MG
1000 TABLET ORAL EVERY 6 HOURS
Refills: 0 | Status: DISCONTINUED | OUTPATIENT
Start: 2019-09-17 | End: 2019-09-19

## 2019-09-16 RX ORDER — BUPIVACAINE 13.3 MG/ML
20 INJECTION, SUSPENSION, LIPOSOMAL INFILTRATION ONCE
Refills: 0 | Status: DISCONTINUED | OUTPATIENT
Start: 2019-09-16 | End: 2019-09-16

## 2019-09-16 RX ORDER — ZOLPIDEM TARTRATE 10 MG/1
1 TABLET ORAL
Qty: 0 | Refills: 0 | DISCHARGE

## 2019-09-16 RX ORDER — HYDROMORPHONE HYDROCHLORIDE 2 MG/ML
1 INJECTION INTRAMUSCULAR; INTRAVENOUS; SUBCUTANEOUS
Refills: 0 | Status: DISCONTINUED | OUTPATIENT
Start: 2019-09-16 | End: 2019-09-16

## 2019-09-16 RX ORDER — HYDROMORPHONE HYDROCHLORIDE 2 MG/ML
1 INJECTION INTRAMUSCULAR; INTRAVENOUS; SUBCUTANEOUS EVERY 6 HOURS
Refills: 0 | Status: DISCONTINUED | OUTPATIENT
Start: 2019-09-16 | End: 2019-09-16

## 2019-09-16 RX ORDER — FERROUS SULFATE 325(65) MG
325 TABLET ORAL
Refills: 0 | Status: DISCONTINUED | OUTPATIENT
Start: 2019-09-16 | End: 2019-09-19

## 2019-09-16 RX ORDER — TRAMADOL HYDROCHLORIDE 50 MG/1
50 TABLET ORAL EVERY 6 HOURS
Refills: 0 | Status: DISCONTINUED | OUTPATIENT
Start: 2019-09-16 | End: 2019-09-16

## 2019-09-16 RX ORDER — FOLIC ACID 0.8 MG
1 TABLET ORAL DAILY
Refills: 0 | Status: DISCONTINUED | OUTPATIENT
Start: 2019-09-16 | End: 2019-09-19

## 2019-09-16 RX ORDER — ACETAMINOPHEN 500 MG
1000 TABLET ORAL ONCE
Refills: 0 | Status: COMPLETED | OUTPATIENT
Start: 2019-09-16 | End: 2019-09-16

## 2019-09-16 RX ORDER — ALPRAZOLAM 0.25 MG
1 TABLET ORAL
Qty: 0 | Refills: 0 | DISCHARGE

## 2019-09-16 RX ADMIN — Medication 325 MILLIGRAM(S): at 17:24

## 2019-09-16 RX ADMIN — HYDROMORPHONE HYDROCHLORIDE 0.5 MILLIGRAM(S): 2 INJECTION INTRAMUSCULAR; INTRAVENOUS; SUBCUTANEOUS at 13:37

## 2019-09-16 RX ADMIN — Medication 1000 MILLIGRAM(S): at 17:37

## 2019-09-16 RX ADMIN — SODIUM CHLORIDE 100 MILLILITER(S): 9 INJECTION, SOLUTION INTRAVENOUS at 15:02

## 2019-09-16 RX ADMIN — SODIUM CHLORIDE 75 MILLILITER(S): 9 INJECTION, SOLUTION INTRAVENOUS at 08:19

## 2019-09-16 RX ADMIN — Medication 100 MILLIGRAM(S): at 22:53

## 2019-09-16 RX ADMIN — HYDROMORPHONE HYDROCHLORIDE 0.5 MILLIGRAM(S): 2 INJECTION INTRAMUSCULAR; INTRAVENOUS; SUBCUTANEOUS at 18:53

## 2019-09-16 RX ADMIN — HYDROMORPHONE HYDROCHLORIDE 0.5 MILLIGRAM(S): 2 INJECTION INTRAMUSCULAR; INTRAVENOUS; SUBCUTANEOUS at 13:14

## 2019-09-16 RX ADMIN — HYDROMORPHONE HYDROCHLORIDE 0.5 MILLIGRAM(S): 2 INJECTION INTRAMUSCULAR; INTRAVENOUS; SUBCUTANEOUS at 22:53

## 2019-09-16 RX ADMIN — Medication 400 MILLIGRAM(S): at 17:22

## 2019-09-16 RX ADMIN — HYDROMORPHONE HYDROCHLORIDE 0.5 MILLIGRAM(S): 2 INJECTION INTRAMUSCULAR; INTRAVENOUS; SUBCUTANEOUS at 23:15

## 2019-09-16 RX ADMIN — Medication 500 MILLIGRAM(S): at 17:24

## 2019-09-16 RX ADMIN — TRAMADOL HYDROCHLORIDE 100 MILLIGRAM(S): 50 TABLET ORAL at 16:38

## 2019-09-16 RX ADMIN — HYDROMORPHONE HYDROCHLORIDE 0.5 MILLIGRAM(S): 2 INJECTION INTRAMUSCULAR; INTRAVENOUS; SUBCUTANEOUS at 13:52

## 2019-09-16 RX ADMIN — SODIUM CHLORIDE 75 MILLILITER(S): 9 INJECTION, SOLUTION INTRAVENOUS at 18:01

## 2019-09-16 RX ADMIN — HYDROMORPHONE HYDROCHLORIDE 0.5 MILLIGRAM(S): 2 INJECTION INTRAMUSCULAR; INTRAVENOUS; SUBCUTANEOUS at 17:53

## 2019-09-16 NOTE — PHYSICAL THERAPY INITIAL EVALUATION ADULT - ADDITIONAL COMMENTS
Pt stated he lives in split level home with 5 steps x 2 with railing inside.  Pt stated he has rolling walker and SC at home.

## 2019-09-16 NOTE — PROGRESS NOTE ADULT - SUBJECTIVE AND OBJECTIVE BOX
Postop Check    Patient tolerated the procedure well. Patient seen and examined at bedside.  No acute complaints at this time. Pain well controlled. Patient already sitting up with physical therapy. Denies chest pain, shortness of breath, nausea or vomiting.     PE:  Vital Signs Last 24 Hrs  T(C): 36.6 (09-16-19 @ 14:52), Max: 36.8 (09-16-19 @ 07:42)  T(F): 97.9 (09-16-19 @ 14:52), Max: 98.2 (09-16-19 @ 07:42)  HR: 100 (09-16-19 @ 14:52) (83 - 103)  BP: 109/71 (09-16-19 @ 14:52) (90/50 - 120/81)  BP(mean): --  RR: 16 (09-16-19 @ 14:52) (12 - 20)  SpO2: 100% (09-16-19 @ 14:52) (95% - 100%)    General: NAD, resting comfortably in bed  LLE:   Dressing C/D/I  SCDs present bilaterally  Compartments soft and compressible  No calf tenderness bilaterally  +TA/EHL/FHL/GSC/Fem  SILT L3-S1  2+ DP/PT                          12.0   9.74  )-----------( 130      ( 16 Sep 2019 13:42 )             35.5               A/P:  64y m s/p L DK POD 0  -PT/OT -WBAT LLE  -Pain Control  -DVT ppx w/ SCDs, Xarelto  -Continue perioperative abx x 24 hours  -FU AM Labs  -Rest, ice, compress and elevate the extremity as we needed  -Incentive Spirometry  -Medical management appreciated

## 2019-09-17 ENCOUNTER — TRANSCRIPTION ENCOUNTER (OUTPATIENT)
Age: 64
End: 2019-09-17

## 2019-09-17 LAB
ANION GAP SERPL CALC-SCNC: 8 MMOL/L — SIGNIFICANT CHANGE UP (ref 5–17)
BUN SERPL-MCNC: 12 MG/DL — SIGNIFICANT CHANGE UP (ref 7–23)
CALCIUM SERPL-MCNC: 8.6 MG/DL — SIGNIFICANT CHANGE UP (ref 8.5–10.1)
CHLORIDE SERPL-SCNC: 103 MMOL/L — SIGNIFICANT CHANGE UP (ref 96–108)
CO2 SERPL-SCNC: 29 MMOL/L — SIGNIFICANT CHANGE UP (ref 22–31)
CREAT SERPL-MCNC: 0.92 MG/DL — SIGNIFICANT CHANGE UP (ref 0.5–1.3)
GLUCOSE SERPL-MCNC: 119 MG/DL — HIGH (ref 70–99)
HCT VFR BLD CALC: 34.5 % — LOW (ref 39–50)
HGB BLD-MCNC: 11.4 G/DL — LOW (ref 13–17)
MCHC RBC-ENTMCNC: 31.8 PG — SIGNIFICANT CHANGE UP (ref 27–34)
MCHC RBC-ENTMCNC: 33 GM/DL — SIGNIFICANT CHANGE UP (ref 32–36)
MCV RBC AUTO: 96.1 FL — SIGNIFICANT CHANGE UP (ref 80–100)
NRBC # BLD: 0 /100 WBCS — SIGNIFICANT CHANGE UP (ref 0–0)
NT-PROBNP SERPL-SCNC: 6930 PG/ML — HIGH (ref 0–125)
PLATELET # BLD AUTO: 135 K/UL — LOW (ref 150–400)
POTASSIUM SERPL-MCNC: 4.5 MMOL/L — SIGNIFICANT CHANGE UP (ref 3.5–5.3)
POTASSIUM SERPL-SCNC: 4.5 MMOL/L — SIGNIFICANT CHANGE UP (ref 3.5–5.3)
RBC # BLD: 3.59 M/UL — LOW (ref 4.2–5.8)
RBC # FLD: 13.3 % — SIGNIFICANT CHANGE UP (ref 10.3–14.5)
SODIUM SERPL-SCNC: 140 MMOL/L — SIGNIFICANT CHANGE UP (ref 135–145)
TSH SERPL-MCNC: 1.14 UIU/ML — SIGNIFICANT CHANGE UP (ref 0.36–3.74)
WBC # BLD: 10.14 K/UL — SIGNIFICANT CHANGE UP (ref 3.8–10.5)
WBC # FLD AUTO: 10.14 K/UL — SIGNIFICANT CHANGE UP (ref 3.8–10.5)

## 2019-09-17 PROCEDURE — 71045 X-RAY EXAM CHEST 1 VIEW: CPT | Mod: 26

## 2019-09-17 RX ORDER — FUROSEMIDE 40 MG
20 TABLET ORAL ONCE
Refills: 0 | Status: COMPLETED | OUTPATIENT
Start: 2019-09-17 | End: 2019-09-17

## 2019-09-17 RX ORDER — VALSARTAN 80 MG/1
80 TABLET ORAL DAILY
Refills: 0 | Status: DISCONTINUED | OUTPATIENT
Start: 2019-09-17 | End: 2019-09-19

## 2019-09-17 RX ORDER — HYDROCHLOROTHIAZIDE 25 MG
12.5 TABLET ORAL DAILY
Refills: 0 | Status: DISCONTINUED | OUTPATIENT
Start: 2019-09-17 | End: 2019-09-17

## 2019-09-17 RX ORDER — ACETAMINOPHEN 500 MG
1000 TABLET ORAL ONCE
Refills: 0 | Status: COMPLETED | OUTPATIENT
Start: 2019-09-17 | End: 2019-09-17

## 2019-09-17 RX ADMIN — HYDROMORPHONE HYDROCHLORIDE 2 MILLIGRAM(S): 2 INJECTION INTRAMUSCULAR; INTRAVENOUS; SUBCUTANEOUS at 20:43

## 2019-09-17 RX ADMIN — Medication 81 MILLIGRAM(S): at 12:17

## 2019-09-17 RX ADMIN — Medication 325 MILLIGRAM(S): at 09:22

## 2019-09-17 RX ADMIN — HYDROMORPHONE HYDROCHLORIDE 2 MILLIGRAM(S): 2 INJECTION INTRAMUSCULAR; INTRAVENOUS; SUBCUTANEOUS at 12:06

## 2019-09-17 RX ADMIN — HYDROMORPHONE HYDROCHLORIDE 2 MILLIGRAM(S): 2 INJECTION INTRAMUSCULAR; INTRAVENOUS; SUBCUTANEOUS at 13:06

## 2019-09-17 RX ADMIN — Medication 1 MILLIGRAM(S): at 12:14

## 2019-09-17 RX ADMIN — Medication 400 MILLIGRAM(S): at 09:22

## 2019-09-17 RX ADMIN — Medication 500 MILLIGRAM(S): at 17:47

## 2019-09-17 RX ADMIN — Medication 1000 MILLIGRAM(S): at 02:00

## 2019-09-17 RX ADMIN — Medication 325 MILLIGRAM(S): at 12:14

## 2019-09-17 RX ADMIN — APIXABAN 2.5 MILLIGRAM(S): 2.5 TABLET, FILM COATED ORAL at 17:47

## 2019-09-17 RX ADMIN — Medication 400 MILLIGRAM(S): at 01:29

## 2019-09-17 RX ADMIN — Medication 300 MILLIGRAM(S): at 12:15

## 2019-09-17 RX ADMIN — Medication 1 TABLET(S): at 12:17

## 2019-09-17 RX ADMIN — Medication 1000 MILLIGRAM(S): at 20:43

## 2019-09-17 RX ADMIN — APIXABAN 2.5 MILLIGRAM(S): 2.5 TABLET, FILM COATED ORAL at 05:42

## 2019-09-17 RX ADMIN — Medication 100 MILLIGRAM(S): at 13:30

## 2019-09-17 RX ADMIN — Medication 1000 MILLIGRAM(S): at 21:30

## 2019-09-17 RX ADMIN — Medication 100 MILLIGRAM(S): at 05:41

## 2019-09-17 RX ADMIN — Medication 20 MILLIGRAM(S): at 18:06

## 2019-09-17 RX ADMIN — Medication 100 MILLIGRAM(S): at 05:42

## 2019-09-17 RX ADMIN — Medication 500 MILLIGRAM(S): at 05:42

## 2019-09-17 RX ADMIN — HYDROMORPHONE HYDROCHLORIDE 2 MILLIGRAM(S): 2 INJECTION INTRAMUSCULAR; INTRAVENOUS; SUBCUTANEOUS at 21:30

## 2019-09-17 RX ADMIN — Medication 1000 MILLIGRAM(S): at 09:37

## 2019-09-17 RX ADMIN — PANTOPRAZOLE SODIUM 40 MILLIGRAM(S): 20 TABLET, DELAYED RELEASE ORAL at 05:42

## 2019-09-17 RX ADMIN — Medication 100 MILLIGRAM(S): at 20:43

## 2019-09-17 RX ADMIN — ATORVASTATIN CALCIUM 20 MILLIGRAM(S): 80 TABLET, FILM COATED ORAL at 20:43

## 2019-09-17 RX ADMIN — Medication 325 MILLIGRAM(S): at 17:47

## 2019-09-17 RX ADMIN — HYDROMORPHONE HYDROCHLORIDE 2 MILLIGRAM(S): 2 INJECTION INTRAMUSCULAR; INTRAVENOUS; SUBCUTANEOUS at 05:47

## 2019-09-17 RX ADMIN — HYDROMORPHONE HYDROCHLORIDE 2 MILLIGRAM(S): 2 INJECTION INTRAMUSCULAR; INTRAVENOUS; SUBCUTANEOUS at 04:34

## 2019-09-17 NOTE — DISCHARGE NOTE PROVIDER - PROVIDER TOKENS
PROVIDER:[TOKEN:[[id number]]] PROVIDER:[TOKEN:[[id number]]],PROVIDER:[TOKEN:[[id number]],FOLLOWUP:[2 weeks]]

## 2019-09-17 NOTE — OCCUPATIONAL THERAPY INITIAL EVALUATION ADULT - PHYSICAL ASSIST/NONPHYSICAL ASSIST:DRESS UPPER BODY, OT EVAL
Calling regarding:     Pt called and stated she would like to know if she can be seen before 9/4/19.  Pt stated she has some concerns regarding pregnancy. Pt stated she had really and morning sickness and then it just stopped. Pt would like a call back.   Please advise      Phone number to be reached at:. HOME: [phone]   WORK: N/A   CELL: [phone]          set-up required

## 2019-09-17 NOTE — PROGRESS NOTE ADULT - SUBJECTIVE AND OBJECTIVE BOX
DAMARISTAYLER      64y   male  MRN-[id number]    ORTHOPEDIC SURGERY / DR. GREENE    POD # 1    Vital Signs Last 24 Hrs  T(C): 37.2 (17 Sep 2019 05:00), Max: 37.2 (16 Sep 2019 23:40)  T(F): 99 (17 Sep 2019 05:00), Max: 99 (16 Sep 2019 23:40)  HR: 96 (17 Sep 2019 05:00) (83 - 103)  BP: 110/72 (17 Sep 2019 05:00) (90/50 - 120/81)  BP(mean): --  RR: 18 (17 Sep 2019 05:00) (12 - 20)  SpO2: 98% (17 Sep 2019 05:00) (95% - 100%)    LEFT HIP :    DRESSING DRY AND INTACT  GOOD MOTOR TO LEFT LOWER EXTREMITY  NEURO-VASCULAR STATUS INTACT  NO CALF TENDERNESS    POST OP    Hemoglobin: 12.0 (09-16 @ 13:42)  Hematocrit: 35.5 (09-16 @ 13:42)                            ASSESSMENT &  PLAN:  POD # 1 S/P LEFT TOTAL HIP REPLACEMENT    WEIGHT  BEARING AS TOLERATED, OOB AND AMBULATE, PHYSICAL THERAPY   H/O AFIB, POST PACEMAKER ON ELIQUIS 5 MG BID AT HOME, WILL START ELIQUIS 2.5 MG BID TODAY IF H/H REMAINS STABLE WILL RESUME FULL DOSE TOMORROW    F/U LABS   DISCHARGE PLANNING TO HOME WITH HOME CARE

## 2019-09-17 NOTE — PROGRESS NOTE ADULT - SUBJECTIVE AND OBJECTIVE BOX
The patient was interviewed and evaluated  64y Male    T(C): 37.2 (09-17-19 @ 05:00), Max: 37.2 (09-16-19 @ 23:40)  HR: 110 (09-17-19 @ 07:45) (83 - 110)  BP: 124/91 (09-17-19 @ 07:45) (90/50 - 124/91)  RR: 18 (09-17-19 @ 05:00) (12 - 20)  SpO2: 95% (09-17-19 @ 07:45) (95% - 100%)    Pt seen, doing well, no anesthesia complications or complaints noted or reported.   No Nausea    All questions answered    No additional recommendations.     Pain well controlled

## 2019-09-17 NOTE — DISCHARGE NOTE PROVIDER - CARE PROVIDER_API CALL
Mekhi Pavon)  Orthopaedic Surgery  [address] [place] [address]  Phone: [phone]  Fax: [phone]  Follow Up Time: Mekhi Pavon)  Orthopaedic Surgery  [address] [place] [address]  Phone: [phone]  Fax: [phone]  Follow Up Time:     Ghazal Reyes)  Cardiovascular Disease; Internal Medicine  [address] [place] [address]  Phone: [phone]  Fax: [phone]  Follow Up Time: 2 weeks

## 2019-09-17 NOTE — PROGRESS NOTE ADULT - SUBJECTIVE AND OBJECTIVE BOX
Patient is a 64y old  Male who presents with a chief complaint of Left hip replacement. (03 Sep 2019 14:48)  events noted- some BURGESS w PT today- now feeling better off IVF, s/p iv lasix X 1    INTERVAL HPI/OVERNIGHT EVENTS:  T(C): 36.8 (09-17-19 @ 07:52), Max: 37.2 (09-16-19 @ 23:40)  HR: 113 (09-17-19 @ 07:52) (88 - 113)  BP: 124/91 (09-17-19 @ 07:52) (97/72 - 124/91)  RR: 18 (09-17-19 @ 07:52) (18 - 18)  SpO2: 91% (09-17-19 @ 07:52) (91% - 100%)  Wt(kg): --  I&O's Summary    16 Sep 2019 07:01  -  17 Sep 2019 07:00  --------------------------------------------------------  IN: 740 mL / OUT: 750 mL / NET: -10 mL        LABS:                        11.4   10.14 )-----------( 135      ( 17 Sep 2019 06:32 )             34.5     09-17    140  |  103  |  12  ----------------------------<  119<H>  4.5   |  29  |  0.92    Ca    8.6      17 Sep 2019 06:32          CAPILLARY BLOOD GLUCOSE                MEDICATIONS  (STANDING):  acetaminophen   Tablet .. 1000 milliGRAM(s) Oral every 6 hours  allopurinol 300 milliGRAM(s) Oral daily  apixaban 2.5 milliGRAM(s) Oral two times a day  ascorbic acid 500 milliGRAM(s) Oral two times a day  aspirin enteric coated 81 milliGRAM(s) Oral daily  atorvastatin 20 milliGRAM(s) Oral at bedtime  docusate sodium 100 milliGRAM(s) Oral three times a day  ferrous    sulfate 325 milliGRAM(s) Oral three times a day with meals  folic acid 1 milliGRAM(s) Oral daily  furosemide   Injectable 20 milliGRAM(s) IV Push once  hydrochlorothiazide 12.5 milliGRAM(s) Oral daily  metoprolol tartrate 100 milliGRAM(s) Oral daily  multivitamin 1 Tablet(s) Oral daily  pantoprazole    Tablet 40 milliGRAM(s) Oral before breakfast  valsartan 80 milliGRAM(s) Oral daily    MEDICATIONS  (PRN):  ALPRAZolam 0.25 milliGRAM(s) Oral daily PRN ANXIETY  HYDROmorphone   Tablet 2 milliGRAM(s) Oral every 6 hours PRN Moderate Pain (4 - 6)  HYDROmorphone  Injectable 0.5 milliGRAM(s) IV Push every 6 hours PRN Severe Pain (7 - 10)  ondansetron Injectable 4 milliGRAM(s) IV Push every 6 hours PRN Nausea and/or Vomiting  traMADol 50 milliGRAM(s) Oral every 6 hours PRN Mild Pain (1 - 3)      REVIEW OF SYSTEMS:  CONSTITUTIONAL: No fever, weight loss, or fatigue  EYES: No eye pain, visual disturbances, or discharge  ENMT:  No difficulty hearing, tinnitus, vertigo; No sinus or throat pain  NECK: No pain or stiffness  RESPIRATORY: No cough, wheezing, chills or hemoptysis; No shortness of breath  CARDIOVASCULAR: No chest pain, palpitations, dizziness, or leg swelling  GASTROINTESTINAL: No abdominal or epigastric pain. No nausea, vomiting, or hematemesis; No diarrhea or constipation. No melena or hematochezia.  GENITOURINARY: No dysuria, frequency, hematuria, or incontinence  NEUROLOGICAL: No headaches, memory loss, loss of strength, numbness, or tremors  SKIN: No itching, burning, rashes, or lesions   LYMPH NODES: No enlarged glands  ENDOCRINE: No heat or cold intolerance; No hair loss  MUSCULOSKELETAL: chronic l hip pain  PSYCHIATRIC: No depression, anxiety, mood swings, or difficulty sleeping  HEME/LYMPH: No easy bruising, or bleeding gums  ALLERY AND IMMUNOLOGIC: No hives or eczema    RADIOLOGY & ADDITIONAL TESTS:    Imaging Personally Reviewed:  [ ] YES  [ ] NO    Consultant(s) Notes Reviewed:  [ ] YES  [ ] NO    PHYSICAL EXAM:  GENERAL: NAD, well-groomed, well-developed  HEAD:  Atraumatic, Normocephalic  EYES: EOMI, PERRLA, conjunctiva and sclera clear  ENMT: No tonsillar erythema, exudates, or enlargement; Moist mucous membranes, Good dentition, No lesions  NECK: Supple, No JVD, Normal thyroid  NERVOUS SYSTEM:  Alert & Oriented X3, Good concentration; Motor Strength 5/5 B/L upper and lower extremities; DTRs 2+ intact and symmetric  CHEST/LUNG: Clear to percussion bilaterally; No rales, rhonchi, wheezing, or rubs  HEART: Regular rate and rhythm; No murmurs, rubs, or gallops  ABDOMEN: Soft, Nontender, Nondistended; Bowel sounds present  EXTREMITIES:  2+ Peripheral Pulses, No clubbing, cyanosis, or edema  LYMPH: No lymphadenopathy noted  SKIN: No rashes or lesions    Care Discussed with Consultants/Other Providers [ ] YES  [ ] NO

## 2019-09-17 NOTE — DISCHARGE NOTE PROVIDER - NSDCFUADDINST_GEN_ALL_CORE_FT
WEIGHT BEARING AS TOLERATED.  FOLLOW UP WITH DR. GREENE  NEXT THURSDAY 09/26/2019 CALL [phone] FOR AN APPOINTMENT.  KEEP HIP AQUACEL  DRESSING  ON DRY AND CLEAN, IT WILL BE CHANGED OR REMOVED ON YOUR NEXT OFFICE.

## 2019-09-17 NOTE — OCCUPATIONAL THERAPY INITIAL EVALUATION ADULT - ADDITIONAL COMMENTS
Pt lives in a split level house with 7-8 steps with no handrail to enter front door. 5 steps with handrail to access bedroom and full bath or den with 1/2 bath. Pt has a stall shower. Pt owns a rolling walker, cane and LH shoe horn. Spouse assisted patient with lower body dressing. Pt reports that he may stay and sleep in a recliner chair on the lower level of his house upon d/c home.

## 2019-09-17 NOTE — CONSULT NOTE ADULT - PROBLEM SELECTOR RECOMMENDATION 9
cont toprol 100 daily (dose recently increased from 50 to 100)  agree w eliquis dosing
post op - I chayito, dvt  p, pt as tolerated  will dc IVF - volume overload evident - pulm edema on CXR and high ProBNP  monitor labs  monitor vs and HD and Sat  cvs rx regimen and BP control  dietary discretion  cont use of CPAP night time  sleep hygiene and weight management discussed  will follow

## 2019-09-18 LAB
HCT VFR BLD CALC: 33.2 % — LOW (ref 39–50)
HGB BLD-MCNC: 11.2 G/DL — LOW (ref 13–17)
MAGNESIUM SERPL-MCNC: 2 MG/DL — SIGNIFICANT CHANGE UP (ref 1.6–2.6)
MCHC RBC-ENTMCNC: 32.3 PG — SIGNIFICANT CHANGE UP (ref 27–34)
MCHC RBC-ENTMCNC: 33.7 GM/DL — SIGNIFICANT CHANGE UP (ref 32–36)
MCV RBC AUTO: 95.7 FL — SIGNIFICANT CHANGE UP (ref 80–100)
NRBC # BLD: 0 /100 WBCS — SIGNIFICANT CHANGE UP (ref 0–0)
PLATELET # BLD AUTO: 116 K/UL — LOW (ref 150–400)
RBC # BLD: 3.47 M/UL — LOW (ref 4.2–5.8)
RBC # FLD: 13.4 % — SIGNIFICANT CHANGE UP (ref 10.3–14.5)
SURGICAL PATHOLOGY STUDY: SIGNIFICANT CHANGE UP
WBC # BLD: 9.5 K/UL — SIGNIFICANT CHANGE UP (ref 3.8–10.5)
WBC # FLD AUTO: 9.5 K/UL — SIGNIFICANT CHANGE UP (ref 3.8–10.5)

## 2019-09-18 RX ORDER — METOPROLOL TARTRATE 50 MG
5 TABLET ORAL ONCE
Refills: 0 | Status: COMPLETED | OUTPATIENT
Start: 2019-09-18 | End: 2019-09-19

## 2019-09-18 RX ORDER — APIXABAN 2.5 MG/1
5 TABLET, FILM COATED ORAL EVERY 12 HOURS
Refills: 0 | Status: DISCONTINUED | OUTPATIENT
Start: 2019-09-18 | End: 2019-09-19

## 2019-09-18 RX ORDER — ACETAMINOPHEN 500 MG
650 TABLET ORAL EVERY 6 HOURS
Refills: 0 | Status: DISCONTINUED | OUTPATIENT
Start: 2019-09-18 | End: 2019-09-18

## 2019-09-18 RX ADMIN — Medication 5 MILLIGRAM(S): at 22:20

## 2019-09-18 RX ADMIN — Medication 1000 MILLIGRAM(S): at 12:34

## 2019-09-18 RX ADMIN — Medication 325 MILLIGRAM(S): at 17:55

## 2019-09-18 RX ADMIN — Medication 500 MILLIGRAM(S): at 17:55

## 2019-09-18 RX ADMIN — Medication 650 MILLIGRAM(S): at 16:32

## 2019-09-18 RX ADMIN — ATORVASTATIN CALCIUM 20 MILLIGRAM(S): 80 TABLET, FILM COATED ORAL at 21:13

## 2019-09-18 RX ADMIN — Medication 1 MILLIGRAM(S): at 12:35

## 2019-09-18 RX ADMIN — HYDROMORPHONE HYDROCHLORIDE 2 MILLIGRAM(S): 2 INJECTION INTRAMUSCULAR; INTRAVENOUS; SUBCUTANEOUS at 12:26

## 2019-09-18 RX ADMIN — Medication 300 MILLIGRAM(S): at 12:35

## 2019-09-18 RX ADMIN — Medication 100 MILLIGRAM(S): at 05:55

## 2019-09-18 RX ADMIN — APIXABAN 5 MILLIGRAM(S): 2.5 TABLET, FILM COATED ORAL at 06:27

## 2019-09-18 RX ADMIN — APIXABAN 5 MILLIGRAM(S): 2.5 TABLET, FILM COATED ORAL at 17:55

## 2019-09-18 RX ADMIN — Medication 100 MILLIGRAM(S): at 14:15

## 2019-09-18 RX ADMIN — Medication 1000 MILLIGRAM(S): at 07:10

## 2019-09-18 RX ADMIN — Medication 81 MILLIGRAM(S): at 12:35

## 2019-09-18 RX ADMIN — Medication 1 TABLET(S): at 12:35

## 2019-09-18 RX ADMIN — Medication 1000 MILLIGRAM(S): at 02:15

## 2019-09-18 RX ADMIN — PANTOPRAZOLE SODIUM 40 MILLIGRAM(S): 20 TABLET, DELAYED RELEASE ORAL at 06:27

## 2019-09-18 RX ADMIN — Medication 325 MILLIGRAM(S): at 08:12

## 2019-09-18 RX ADMIN — Medication 325 MILLIGRAM(S): at 12:35

## 2019-09-18 RX ADMIN — Medication 1000 MILLIGRAM(S): at 01:26

## 2019-09-18 RX ADMIN — Medication 1000 MILLIGRAM(S): at 06:30

## 2019-09-18 RX ADMIN — Medication 1000 MILLIGRAM(S): at 12:38

## 2019-09-18 RX ADMIN — HYDROMORPHONE HYDROCHLORIDE 2 MILLIGRAM(S): 2 INJECTION INTRAMUSCULAR; INTRAVENOUS; SUBCUTANEOUS at 11:26

## 2019-09-18 RX ADMIN — Medication 500 MILLIGRAM(S): at 05:55

## 2019-09-18 RX ADMIN — Medication 100 MILLIGRAM(S): at 21:13

## 2019-09-18 RX ADMIN — Medication 650 MILLIGRAM(S): at 16:33

## 2019-09-18 NOTE — PROGRESS NOTE ADULT - SUBJECTIVE AND OBJECTIVE BOX
Date/Time Patient Seen:  		  Referring MD:   Data Reviewed	       Patient is a 64y old  Male who presents with a chief complaint of Left hip replacement. (03 Sep 2019 14:48)      Subjective/HPI     PAST MEDICAL & SURGICAL HISTORY:  Stenosis of right carotid artery  Obesity: BMI 36.5 on 12/1/17  Aortic stenosis: s/p AVR - Bovine 2013  CAD (coronary artery disease): s/p CABG x 3 2009 at [facility]  A-fib: s/p cardiac ablation 3/2016  Gout  Hyperlipidemia  Hypertension  Pacemaker  H/O carotid endarterectomy: right carotid  H/O aortic valve replacement: bovine, 9/26/2013  S/P TKR (total knee replacement): right, 8/2011  3-vessel coronary artery disease: CABG x 3 on 8/2009        Medication list         MEDICATIONS  (STANDING):  acetaminophen   Tablet .. 1000 milliGRAM(s) Oral every 6 hours  allopurinol 300 milliGRAM(s) Oral daily  apixaban 5 milliGRAM(s) Oral every 12 hours  ascorbic acid 500 milliGRAM(s) Oral two times a day  aspirin enteric coated 81 milliGRAM(s) Oral daily  atorvastatin 20 milliGRAM(s) Oral at bedtime  docusate sodium 100 milliGRAM(s) Oral three times a day  ferrous    sulfate 325 milliGRAM(s) Oral three times a day with meals  folic acid 1 milliGRAM(s) Oral daily  metoprolol tartrate 100 milliGRAM(s) Oral daily  multivitamin 1 Tablet(s) Oral daily  pantoprazole    Tablet 40 milliGRAM(s) Oral before breakfast  valsartan 80 milliGRAM(s) Oral daily    MEDICATIONS  (PRN):  ALPRAZolam 0.25 milliGRAM(s) Oral daily PRN ANXIETY  HYDROmorphone   Tablet 2 milliGRAM(s) Oral every 6 hours PRN Moderate Pain (4 - 6)  HYDROmorphone  Injectable 0.5 milliGRAM(s) IV Push every 6 hours PRN Severe Pain (7 - 10)  ondansetron Injectable 4 milliGRAM(s) IV Push every 6 hours PRN Nausea and/or Vomiting  traMADol 50 milliGRAM(s) Oral every 6 hours PRN Mild Pain (1 - 3)         Vitals log        ICU Vital Signs Last 24 Hrs  T(C): 36.4 (18 Sep 2019 04:45), Max: 38.1 (17 Sep 2019 17:46)  T(F): 97.5 (18 Sep 2019 04:45), Max: 100.5 (17 Sep 2019 17:46)  HR: 79 (18 Sep 2019 04:45) (79 - 124)  BP: 106/75 (18 Sep 2019 04:45) (100/72 - 124/91)  BP(mean): --  ABP: --  ABP(mean): --  RR: 20 (18 Sep 2019 04:45) (16 - 21)  SpO2: 98% (18 Sep 2019 04:45) (91% - 98%)           Input and Output:  I&O's Detail    16 Sep 2019 07:01  -  17 Sep 2019 07:00  --------------------------------------------------------  IN:    lactated ringers.: 200 mL    Oral Fluid: 340 mL    Solution: 100 mL    Solution: 100 mL  Total IN: 740 mL    OUT:    Voided: 750 mL  Total OUT: 750 mL    Total NET: -10 mL          Lab Data                        11.4   10.14 )-----------( 135      ( 17 Sep 2019 06:32 )             34.5     09-17    140  |  103  |  12  ----------------------------<  119<H>  4.5   |  29  |  0.92    Ca    8.6      17 Sep 2019 06:32              Review of Systems	      Objective     Physical Examination  heart s1s2  lung dec BS  abd soft  on o2 support        Pertinent Lab findings & Imaging      Magalis:  NO   Adequate UO     I&O's Detail    16 Sep 2019 07:01  -  17 Sep 2019 07:00  --------------------------------------------------------  IN:    lactated ringers.: 200 mL    Oral Fluid: 340 mL    Solution: 100 mL    Solution: 100 mL  Total IN: 740 mL    OUT:    Voided: 750 mL  Total OUT: 750 mL    Total NET: -10 mL               Discussed with:     Cultures:	        Radiology

## 2019-09-18 NOTE — CHART NOTE - NSCHARTNOTEFT_GEN_A_CORE
Resident Rapid Response Note    Rapid response was called at _____ for _____.    Events leading up to Rapid Response:    Patient was seen and examined at the bedside by the rapid response team and resident medicine team.  ___ () / ICU PA at bedside.    Rapid Response Vital Signs:  BP:  HR:  RR:  SpO2: % on   Temp:  FS:    Vital Signs Last 24 Hrs  T(C): 37.3 (18 Sep 2019 21:26), Max: 38.1 (18 Sep 2019 15:46)  T(F): 99.1 (18 Sep 2019 21:26), Max: 100.6 (18 Sep 2019 15:46)  HR: 108 (18 Sep 2019 21:26) (79 - 108)  BP: 127/78 (18 Sep 2019 21:26) (100/72 - 127/78)  BP(mean): --  RR: 18 (18 Sep 2019 21:26) (18 - 20)  SpO2: 100% (18 Sep 2019 21:26) (94% - 100%)    Physical Exam:  General: Well developed, well nourished, in no acute distress  HEENT: NCAT, PERRLA, EOMI bl, moist mucous membranes   Neck: Supple, nontender, no mass  Neurology: A&Ox3, nonfocal, CN II-XII grossly intact, sensation intact, no gait abnormalities   Respiratory: CTA B/L, No wheezing, rales, or rhonchi  CV: RRR, S1/S2 present, no murmurs, rubs, or gallops  Abdominal: Soft, nontender, non-distended, normoactive bowel sounds  Extremities: No C/C/E, peripheral pulses present  MSK: Normal ROM, no joint erythema or warmth, no joint swelling   Skin: warm, dry, normal color, no obvious rash or abnormal lesions      Assessment/Plan:    -Discussed with  , who agreed with above plan.  -Will continue to follow, RN to call if any changes. Resident Rapid Response Note    Rapid response was called at 2205 for ABDOUL.    Events leading up to Rapid Response:  Pt. on remote tele with Afib at rate of 165    Patient was seen and examined at the bedside by the rapid response team and resident medicine team. Dr. Randall and ICU PA at bedside.    Rapid Response Vital Signs:  BP:143/96  HR:138  RR:20  SpO2: 99% on RA   Temp:99.2  FS: 100    Vital Signs Last 24 Hrs  T(C): 37.3 (18 Sep 2019 21:26), Max: 38.1 (18 Sep 2019 15:46)  T(F): 99.1 (18 Sep 2019 21:26), Max: 100.6 (18 Sep 2019 15:46)  HR: 108 (18 Sep 2019 21:26) (79 - 108)  BP: 127/78 (18 Sep 2019 21:26) (100/72 - 127/78)  BP(mean): --  RR: 18 (18 Sep 2019 21:26) (18 - 20)  SpO2: 100% (18 Sep 2019 21:26) (94% - 100%)    Physical Exam:  General: Well developed, well nourished, in no acute distress  HEENT: NCAT, PERRLA, EOMI bl, moist mucous membranes   Neurology: A&Ox3, nonfocal, CN II-XII grossly intact, sensation intact  Respiratory: CTA B/L, No wheezing, rales, or rhonchi  CV: tachycardic, irregular rate and rhythm, S1/S2 present, no murmurs, rubs, or gallops  Abdominal: Soft, nontender, non-distended, normoactive bowel sounds  Extremities: No C/C/E, peripheral pulses present  Skin: warm, dry, normal color, no obvious rash or abnormal lesions    Assessment/Plan:  63 y/o male, PMH of HTN, Afib, LBBB, TAVR, Pacemaker 4/2019, with c/o left hip pain, bothering him for about 7 months. s/p left hip replacement with Dr Pavon, POD 2    RRT called for ABDOUL with rate of 165    -pt. with history of previously rate controlled afib, denies any chest pain, shortness of breath or discomfort, all other vital signs stable  -pt. receiving lopressor 100mg once a day, eliquis has been restart now that H&H has stabilized postop  -STAT IV push of 5mg Lopressor  -No further testing to be ordered at this time  -Will follow up in 2 hours, if rate remains uncontrolled will give additional 5mg Lopressor IV push  -Discussed with Dr. Randall, ICU PA, senior resident and patient, who agreed with above plan.  -Will continue to follow, RN to call if any changes. Resident Rapid Response Note    Rapid response was called at 2205 for ABDOUL.    Events leading up to Rapid Response:  Pt. on remote tele with Afib at rate of 165    Patient was seen and examined at the bedside by the rapid response team and resident medicine team. Dr. Randall and ICU PA at bedside. Patient laying comfortably in bed. Denies chest pain, shortness of breath, headache, nausea, vomiting, abdominal pain. Pt. with history of previously rate controlled Afib. S/P left hip replacement POD2    Rapid Response Vital Signs:  BP:143/96  HR:138  RR:20  SpO2: 99% on RA   Temp:99.2  FS: 100    Vital Signs Last 24 Hrs  T(C): 37.3 (18 Sep 2019 21:26), Max: 38.1 (18 Sep 2019 15:46)  T(F): 99.1 (18 Sep 2019 21:26), Max: 100.6 (18 Sep 2019 15:46)  HR: 108 (18 Sep 2019 21:26) (79 - 108)  BP: 127/78 (18 Sep 2019 21:26) (100/72 - 127/78)  BP(mean): --  RR: 18 (18 Sep 2019 21:26) (18 - 20)  SpO2: 100% (18 Sep 2019 21:26) (94% - 100%)    Physical Exam:  General: Well developed, well nourished, in no acute distress  HEENT: NCAT, PERRLA, EOMI bl, moist mucous membranes   Neurology: A&Ox3, nonfocal, CN II-XII grossly intact, sensation intact  Respiratory: CTA B/L, No wheezing, rales, or rhonchi  CV: tachycardic, irregular rate and rhythm, S1/S2 present, no murmurs, rubs, or gallops  Abdominal: Soft, nontender, non-distended, normoactive bowel sounds  Extremities: No C/C/E, peripheral pulses present  Skin: warm, dry, normal color, no obvious rash or abnormal lesions    Assessment/Plan:  65 y/o male, PMH of HTN, Afib, LBBB, TAVR, Pacemaker 4/2019, with c/o left hip pain, bothering him for about 7 months. s/p left hip replacement with Dr Pavon, POD 2    RRT called for ABDOUL with rate of 165    -pt. with history of previously rate controlled afib, denies any chest pain, shortness of breath or discomfort, all other vital signs stable  -pt. receiving lopressor 100mg once a day, eliquis has been restart now that H&H has stabilized postop  -STAT IV push of 5mg Lopressor  -No further testing to be ordered at this time  -Will follow up in 2 hours, if rate remains uncontrolled will give additional 5mg Lopressor IV push  -Discussed with Dr. Randall, ICU PA, senior resident and patient, who agreed with above plan.  -Will continue to follow, RN to call if any changes.    Dr. Maciel  [phone] Resident Rapid Response Note    Rapid response was called at 2205 for rapid afib.    Events leading up to Rapid Response:  Pt. on remote tele with Afib at rate of 165    Patient was seen and examined at the bedside by the rapid response team and resident medicine team. Dr. Randall and ICU PA at bedside. Patient laying comfortably in bed. Denies chest pain, shortness of breath, headache, nausea, vomiting, abdominal pain. Pt. with history of previously rate controlled Afib currently S/P left hip replacement POD2.    Rapid Response Vital Signs:  BP:143/96  HR:138  RR:20  SpO2: 99% on RA   Temp:99.2  FS: 100    Vital Signs Last 24 Hrs  T(C): 37.3 (18 Sep 2019 21:26), Max: 38.1 (18 Sep 2019 15:46)  T(F): 99.1 (18 Sep 2019 21:26), Max: 100.6 (18 Sep 2019 15:46)  HR: 108 (18 Sep 2019 21:26) (79 - 108)  BP: 127/78 (18 Sep 2019 21:26) (100/72 - 127/78)  BP(mean): --  RR: 18 (18 Sep 2019 21:26) (18 - 20)  SpO2: 100% (18 Sep 2019 21:26) (94% - 100%)    Physical Exam:  General: Well developed, well nourished, in no acute distress  HEENT: NCAT, PERRLA, EOMI bl, moist mucous membranes   Neurology: A&Ox3, nonfocal, CN II-XII grossly intact, sensation intact  Respiratory: CTA B/L, No wheezing, rales, or rhonchi  CV: tachycardic, irregular rate and rhythm, S1/S2 present, no murmurs, rubs, or gallops  Abdominal: Soft, nontender, non-distended, normoactive bowel sounds  Extremities: No C/C/E, peripheral pulses present  Skin: warm, dry, normal color, no obvious rash or abnormal lesions    Assessment/Plan:  65 y/o male, PMH of HTN, Afib, LBBB, TAVR, Pacemaker 4/2019, with c/o left hip pain, bothering him for about 7 months. s/p left hip replacement with Dr Pavon, POD 2    RRT called for rapid AFib with rate of 165    -pt. with history of previously rate controlled afib, denies any chest pain, shortness of breath or discomfort, all other vital signs stable  -pt. receiving lopressor 100mg once a day, eliquis had been restarted by day team  -will give STAT IV push of 5mg Lopressor now, and observe on bedside monitor for 1 hour per protocol  -Will follow up in 2 hours, if rate remains uncontrolled will give additional 5mg Lopressor IV push  -Discussed with Dr. Randall, ICU PA, senior resident and patient, who agreed with above plan. Call placed to Dr. Perlman, who is aware and agrees w/ plan.  -Will continue to follow, RN to call if any changes.    Dr. Maciel  [phone]

## 2019-09-18 NOTE — PROGRESS NOTE ADULT - PROBLEM SELECTOR PLAN 1
post op care  I chayito  dvt p- on eliquis  IVF stopped - s/p one dose of LASIX  needs to have cardiac meds restarted - extensive cardiac hx -ProBNP and CXR noted  taper o2 support as tolerated - keep sat > 88 pct  mobilize with assist - PT  pain regimen - caution with Opioids in pt with JEF  enc use of CPAP nightly - pt has his own machine at bedside  vs and meds and labs and imaging reviewed with patient  dc planning  cm notes reviewed  will follow

## 2019-09-18 NOTE — PROGRESS NOTE ADULT - SUBJECTIVE AND OBJECTIVE BOX
HERMELINDATAYLER SUTTON      64y   male  [id number]    ORTHOPEDIC SURGERY / DR. GREENE    POD # 2    Vital Signs Last 24 Hrs  T(C): 36.4 (18 Sep 2019 04:45), Max: 38.1 (17 Sep 2019 17:46)  T(F): 97.5 (18 Sep 2019 04:45), Max: 100.5 (17 Sep 2019 17:46)  HR: 79 (18 Sep 2019 04:45) (79 - 124)  BP: 106/75 (18 Sep 2019 04:45) (100/72 - 124/91)  BP(mean): --  RR: 20 (18 Sep 2019 04:45) (16 - 21)  SpO2: 98% (18 Sep 2019 04:45) (91% - 98%)    LEFT HIP :    DRESSING DRY AND INTACT  SOME EDEMA  GOOD MOTOR TO LEFT LOWER EXTREMITY  NEURO-VASCULAR STATUS INTACT  NO CALF TENDERNESS    Hemoglobin: 11.4 (09-17 @ 06:32)  Hemoglobin: 12.0 (09-16 @ 13:42)    Hematocrit: 34.5 (09-17 @ 06:32)  Hematocrit: 35.5 (09-16 @ 13:42)    09-17    140  |  103  |  12  ----------------------------<  119<H>  4.5   |  29  |  0.92    Ca    8.6      17 Sep 2019 06:32                   ASSESSMENT &  PLAN:      POD # 2 S/P LEFT TOTAL HIP REPLACEMENT  H/O AFIB, POST AORTIC VALVE REPLACEMENT , S/P CABG  JEF     WEIGHT  BEARING AS TOLERATED, OOB AND AMBULATE, PHYSICAL THERAPY   ELIQUIS 2.5 MG BID GIVEN YESTERDAY, H/H STABLE F/U H/H THIS MORNING, WILL RESUME ELIQUIS 5 MG BID TODAY   CONTINUE CPAP  PULMONARY CONSULT, CHEST X-RAY, INTERVENTION NOTED AND APPRECIATED  MEDICAL F/U NOTED AND APPRECIATED    DISCHARGE PLANNING TO HOME WITH HOME CARE  IN AM

## 2019-09-18 NOTE — PROGRESS NOTE ADULT - SUBJECTIVE AND OBJECTIVE BOX
Patient is a 64y old  Male who presents with a chief complaint of Left hip replacement. (03 Sep 2019 14:48)  feels well, without complaints  denies chest pain, denies shortness of breath, denies palpitations      INTERVAL HPI/OVERNIGHT EVENTS:  T(C): 36.5 (09-18-19 @ 07:49), Max: 38.1 (09-17-19 @ 17:46)  HR: 82 (09-18-19 @ 07:49) (79 - 124)  BP: 100/72 (09-18-19 @ 07:49) (100/72 - 123/87)  RR: 19 (09-18-19 @ 07:49) (16 - 21)  SpO2: 98% (09-18-19 @ 07:49) (93% - 98%)  Wt(kg): --  I&O's Summary    17 Sep 2019 07:01  -  18 Sep 2019 07:00  --------------------------------------------------------  IN: 0 mL / OUT: 650 mL / NET: -650 mL    18 Sep 2019 07:01  -  18 Sep 2019 10:04  --------------------------------------------------------  IN: 240 mL / OUT: 0 mL / NET: 240 mL        LABS:                        11.2   9.50  )-----------( 116      ( 18 Sep 2019 07:21 )             33.2     09-17    140  |  103  |  12  ----------------------------<  119<H>  4.5   |  29  |  0.92    Ca    8.6      17 Sep 2019 06:32  Phos  2.7     09-18  Mg     2.0     09-18          CAPILLARY BLOOD GLUCOSE                MEDICATIONS  (STANDING):  acetaminophen   Tablet .. 1000 milliGRAM(s) Oral every 6 hours  allopurinol 300 milliGRAM(s) Oral daily  apixaban 5 milliGRAM(s) Oral every 12 hours  ascorbic acid 500 milliGRAM(s) Oral two times a day  aspirin enteric coated 81 milliGRAM(s) Oral daily  atorvastatin 20 milliGRAM(s) Oral at bedtime  docusate sodium 100 milliGRAM(s) Oral three times a day  ferrous    sulfate 325 milliGRAM(s) Oral three times a day with meals  folic acid 1 milliGRAM(s) Oral daily  metoprolol tartrate 100 milliGRAM(s) Oral daily  multivitamin 1 Tablet(s) Oral daily  pantoprazole    Tablet 40 milliGRAM(s) Oral before breakfast  valsartan 80 milliGRAM(s) Oral daily    MEDICATIONS  (PRN):  ALPRAZolam 0.25 milliGRAM(s) Oral daily PRN ANXIETY  HYDROmorphone   Tablet 2 milliGRAM(s) Oral every 6 hours PRN Moderate Pain (4 - 6)  HYDROmorphone  Injectable 0.5 milliGRAM(s) IV Push every 6 hours PRN Severe Pain (7 - 10)  ondansetron Injectable 4 milliGRAM(s) IV Push every 6 hours PRN Nausea and/or Vomiting  traMADol 50 milliGRAM(s) Oral every 6 hours PRN Mild Pain (1 - 3)      REVIEW OF SYSTEMS:  CONSTITUTIONAL: No fever, weight loss, or fatigue  EYES: No eye pain, visual disturbances, or discharge  ENMT:  No difficulty hearing, tinnitus, vertigo; No sinus or throat pain  NECK: No pain or stiffness  RESPIRATORY: No cough, wheezing, chills or hemoptysis; No shortness of breath- no further dyspnea  CARDIOVASCULAR: No chest pain, palpitations, dizziness, or leg swelling  GASTROINTESTINAL: No abdominal or epigastric pain. No nausea, vomiting, or hematemesis; No diarrhea or constipation. No melena or hematochezia.  GENITOURINARY: No dysuria, frequency, hematuria, or incontinence  NEUROLOGICAL: No headaches, memory loss, loss of strength, numbness, or tremors  SKIN: No itching, burning, rashes, or lesions   LYMPH NODES: No enlarged glands  ENDOCRINE: No heat or cold intolerance; No hair loss  MUSCULOSKELETAL: chronic left hip pain  PSYCHIATRIC: No depression, anxiety, mood swings, or difficulty sleeping  HEME/LYMPH: No easy bruising, or bleeding gums  ALLERY AND IMMUNOLOGIC: No hives or eczema    RADIOLOGY & ADDITIONAL TESTS:    Imaging Personally Reviewed:  [ ] YES  [ ] NO    Consultant(s) Notes Reviewed:  [ ] YES  [ ] NO    PHYSICAL EXAM:  GENERAL: NAD, well-groomed, well-developed  HEAD:  Atraumatic, Normocephalic  EYES: EOMI, PERRLA, conjunctiva and sclera clear  ENMT: No tonsillar erythema, exudates, or enlargement; Moist mucous membranes, Good dentition, No lesions  NECK: Supple, No JVD, Normal thyroid  NERVOUS SYSTEM:  Alert & Oriented X3, Good concentration; Motor Strength 5/5 B/L upper and lower extremities; DTRs 2+ intact and symmetric  CHEST/LUNG: Clear to percussion bilaterally; No rales, rhonchi, wheezing, or rubs  HEART: Regular rate and rhythm; No murmurs, rubs, or gallops  ABDOMEN: Soft, Nontender, Nondistended; Bowel sounds present  EXTREMITIES:  2+ Peripheral Pulses, No clubbing, cyanosis, or edema  LYMPH: No lymphadenopathy noted  SKIN: No rashes or lesions    Care Discussed with Consultants/Other Providers [ ] YES  [ ] NO

## 2019-09-18 NOTE — CHART NOTE - NSCHARTNOTEFT_GEN_A_CORE
Called by RN for pt with 6 beats Vtach on tele monitoring, pt asymptomatic. Will obtain mag/phos in AM. RN to call with any changes, AM team to follow.    Vital Signs Last 24 Hrs  T(C): 36.4 (18 Sep 2019 04:45), Max: 38.1 (17 Sep 2019 17:46)  T(F): 97.5 (18 Sep 2019 04:45), Max: 100.5 (17 Sep 2019 17:46)  HR: 79 (18 Sep 2019 04:45) (79 - 124)  BP: 106/75 (18 Sep 2019 04:45) (100/72 - 124/91)  BP(mean): --  RR: 20 (18 Sep 2019 04:45) (16 - 21)  SpO2: 98% (18 Sep 2019 04:45) (91% - 98%)

## 2019-09-19 ENCOUNTER — TRANSCRIPTION ENCOUNTER (OUTPATIENT)
Age: 64
End: 2019-09-19

## 2019-09-19 VITALS — HEART RATE: 97 BPM

## 2019-09-19 LAB
ANION GAP SERPL CALC-SCNC: 7 MMOL/L — SIGNIFICANT CHANGE UP (ref 5–17)
BUN SERPL-MCNC: 14 MG/DL — SIGNIFICANT CHANGE UP (ref 7–23)
CALCIUM SERPL-MCNC: 8.3 MG/DL — LOW (ref 8.5–10.1)
CHLORIDE SERPL-SCNC: 104 MMOL/L — SIGNIFICANT CHANGE UP (ref 96–108)
CO2 SERPL-SCNC: 29 MMOL/L — SIGNIFICANT CHANGE UP (ref 22–31)
CREAT SERPL-MCNC: 0.74 MG/DL — SIGNIFICANT CHANGE UP (ref 0.5–1.3)
GLUCOSE SERPL-MCNC: 99 MG/DL — SIGNIFICANT CHANGE UP (ref 70–99)
HCT VFR BLD CALC: 30.9 % — LOW (ref 39–50)
HGB BLD-MCNC: 10.5 G/DL — LOW (ref 13–17)
MAGNESIUM SERPL-MCNC: 2.1 MG/DL — SIGNIFICANT CHANGE UP (ref 1.6–2.6)
MCHC RBC-ENTMCNC: 31.9 PG — SIGNIFICANT CHANGE UP (ref 27–34)
MCHC RBC-ENTMCNC: 34 GM/DL — SIGNIFICANT CHANGE UP (ref 32–36)
MCV RBC AUTO: 93.9 FL — SIGNIFICANT CHANGE UP (ref 80–100)
NRBC # BLD: 0 /100 WBCS — SIGNIFICANT CHANGE UP (ref 0–0)
PHOSPHATE SERPL-MCNC: 2.3 MG/DL — LOW (ref 2.5–4.5)
PLATELET # BLD AUTO: 113 K/UL — LOW (ref 150–400)
POTASSIUM SERPL-MCNC: 3.9 MMOL/L — SIGNIFICANT CHANGE UP (ref 3.5–5.3)
POTASSIUM SERPL-SCNC: 3.9 MMOL/L — SIGNIFICANT CHANGE UP (ref 3.5–5.3)
RBC # BLD: 3.29 M/UL — LOW (ref 4.2–5.8)
RBC # FLD: 13.2 % — SIGNIFICANT CHANGE UP (ref 10.3–14.5)
SODIUM SERPL-SCNC: 140 MMOL/L — SIGNIFICANT CHANGE UP (ref 135–145)
WBC # BLD: 7.68 K/UL — SIGNIFICANT CHANGE UP (ref 3.8–10.5)
WBC # FLD AUTO: 7.68 K/UL — SIGNIFICANT CHANGE UP (ref 3.8–10.5)

## 2019-09-19 RX ORDER — METOPROLOL TARTRATE 50 MG
5 TABLET ORAL ONCE
Refills: 0 | Status: COMPLETED | OUTPATIENT
Start: 2019-09-19 | End: 2019-09-18

## 2019-09-19 RX ORDER — HYDROMORPHONE HYDROCHLORIDE 2 MG/ML
1 INJECTION INTRAMUSCULAR; INTRAVENOUS; SUBCUTANEOUS
Qty: 20 | Refills: 0
Start: 2019-09-19 | End: 2019-09-23

## 2019-09-19 RX ORDER — ACETAMINOPHEN 500 MG
2 TABLET ORAL
Qty: 0 | Refills: 0 | DISCHARGE
Start: 2019-09-19

## 2019-09-19 RX ORDER — ALPRAZOLAM 0.25 MG
0.25 TABLET ORAL ONCE
Refills: 0 | Status: DISCONTINUED | OUTPATIENT
Start: 2019-09-19 | End: 2019-09-19

## 2019-09-19 RX ORDER — PANTOPRAZOLE SODIUM 20 MG/1
1 TABLET, DELAYED RELEASE ORAL
Qty: 30 | Refills: 0
Start: 2019-09-19 | End: 2019-10-18

## 2019-09-19 RX ADMIN — Medication 1000 MILLIGRAM(S): at 08:46

## 2019-09-19 RX ADMIN — APIXABAN 5 MILLIGRAM(S): 2.5 TABLET, FILM COATED ORAL at 05:16

## 2019-09-19 RX ADMIN — Medication 1000 MILLIGRAM(S): at 08:45

## 2019-09-19 RX ADMIN — Medication 325 MILLIGRAM(S): at 08:45

## 2019-09-19 RX ADMIN — Medication 5 MILLIGRAM(S): at 01:12

## 2019-09-19 RX ADMIN — PANTOPRAZOLE SODIUM 40 MILLIGRAM(S): 20 TABLET, DELAYED RELEASE ORAL at 05:16

## 2019-09-19 RX ADMIN — Medication 100 MILLIGRAM(S): at 05:16

## 2019-09-19 RX ADMIN — Medication 1000 MILLIGRAM(S): at 01:30

## 2019-09-19 RX ADMIN — Medication 0.25 MILLIGRAM(S): at 01:19

## 2019-09-19 RX ADMIN — HYDROMORPHONE HYDROCHLORIDE 2 MILLIGRAM(S): 2 INJECTION INTRAMUSCULAR; INTRAVENOUS; SUBCUTANEOUS at 09:53

## 2019-09-19 RX ADMIN — HYDROMORPHONE HYDROCHLORIDE 2 MILLIGRAM(S): 2 INJECTION INTRAMUSCULAR; INTRAVENOUS; SUBCUTANEOUS at 08:53

## 2019-09-19 RX ADMIN — Medication 1000 MILLIGRAM(S): at 00:38

## 2019-09-19 RX ADMIN — Medication 500 MILLIGRAM(S): at 05:16

## 2019-09-19 NOTE — PROGRESS NOTE ADULT - PROBLEM SELECTOR PLAN 1
overnight events noted  RRT notes reviewed  may need Cardio eval prior to Discharge Home  JEF - cpap night time as tolerated  o2 support  cvs rx regimen and BP control and restart home Medical Regimen  I and O  out of bed as tolerated  reassurance and anxiolytics  pain regimen / DVT p - on Eliquis -   pt has hx of AF  am labs pending  replete lytes  will follow

## 2019-09-19 NOTE — PROGRESS NOTE ADULT - SUBJECTIVE AND OBJECTIVE BOX
HERMELINDATAYLER SUTTON      64y   male  [id number]    ORTHOPEDIC SURGERY / DR. GREENE    DENIES ANY SOB, CP, ARM/ BACK PAIN, DIAPHORESIS THIS MORNING     POD # 3    Vital Signs Last 24 Hrs  T(C): 36.8 (19 Sep 2019 05:10), Max: 38.1 (18 Sep 2019 15:46)  T(F): 98.2 (19 Sep 2019 05:10), Max: 100.6 (18 Sep 2019 15:46)  HR: 99 (19 Sep 2019 05:10) (82 - 108)  BP: 112/78 (19 Sep 2019 05:10) (92/65 - 127/78)  BP(mean): --  RR: 18 (19 Sep 2019 05:10) (18 - 19)  SpO2: 100% (19 Sep 2019 05:10) (94% - 100%)    LEFT HIP :    WOUND DRY AND INTACT  SOME EDEMA  GOOD MOTOR TO LEFT LOWER EXTREMITY  NEURO-VASCULAR STATUS INTACT  NO CALF TENDERNESS    Hemoglobin: 10.5 (09-19 @ 06:19)  Hemoglobin: 11.2 (09-18 @ 07:21)  Hemoglobin: 11.4 (09-17 @ 06:32)  Hemoglobin: 12.0 (09-16 @ 13:42)    Hematocrit: 30.9 (09-19 @ 06:19)  Hematocrit: 33.2 (09-18 @ 07:21)  Hematocrit: 34.5 (09-17 @ 06:32)  Hematocrit: 35.5 (09-16 @ 13:42)    09-19    140  |  104  |  14  ----------------------------<  99  3.9   |  29  |  0.74    Ca    8.3<L>      19 Sep 2019 06:19  Phos  2.3     09-19  Mg     2.1     09-19      CHEMISTRY TRENDS:    140  3.9  104  29  14  0.74  99  (09-19-19 @ 06:19)      140  4.5  103  29  12  0.92  119  (09-17-19 @ 06:32)           POD # 3 S/P LEFT TOTAL HIP REPLACEMENT  POTS RAPID RESPONSE FOR RAPID AFIB     H/O AFIB, POST AORTIC VALVE REPLACEMENT , S/P CABG  JEF     WEIGHT  BEARING AS TOLERATED, OOB AND AMBULATE, PHYSICAL THERAPY   H/H STABLE,  ELIQUIS 5 MG BID TODAY   CONTINUE CPAP  PULMONARY, MEDICAL F/U  NOTED AND APPRECIATED   ICU EVALUATION NOTED  DISCHARGE PLANNING TO HOME WITH HOME CARE ONCE CLEAR BY  MEDICINE/ CARDIOLOGY

## 2019-09-19 NOTE — CONSULT NOTE ADULT - SUBJECTIVE AND OBJECTIVE BOX
[facility] Cardiology    CHIEF COMPLAINT: Patient is a 64y old  Male who presents with a chief complaint of LEFT HIP OSTEOARTHRITIS  LEFT TOTAL HIP REPLACEMENT (17 Sep 2019 05:14)      HPI:  65 y/o male, PMH of HTN, Afib, LBBB, TAVR, Pacemaker 2019, with c/o left hip pain, bothering him for about 7 months. PT done for a while, takes Motrin or Tylenol for pain as needed. Had right knee replacement in .   s/p left hip replacement.     events noted- tachy last pm, asymptomatic (afib w RVR, hx of chronic afib)  feels well presently s/p lasix with excellent urine outpt    No CP, No SOB    PAST MEDICAL & SURGICAL HISTORY:  Stenosis of right carotid artery  Obesity: BMI 36.5 on 17  Aortic stenosis: s/p AVR - Bovine   CAD (coronary artery disease): s/p CABG x 3  at [facility]  A-fib: s/p cardiac ablation 3/2016  Gout  Hyperlipidemia  Hypertension  Pacemaker  H/O carotid endarterectomy: right carotid  H/O aortic valve replacement: bovine, 2013  S/P TKR (total knee replacement): right, 2011  3-vessel coronary artery disease: CABG x 3 on 2009    SOCIAL HISTORY: no tobacco    FAMILY HISTORY:  Family history of pacemaker: Mother:   FH: pulmonary embolism: Father:       MEDICATIONS  (STANDING):  acetaminophen   Tablet .. 1000 milliGRAM(s) Oral every 6 hours  allopurinol 300 milliGRAM(s) Oral daily  apixaban 5 milliGRAM(s) Oral every 12 hours  ascorbic acid 500 milliGRAM(s) Oral two times a day  aspirin enteric coated 81 milliGRAM(s) Oral daily  atorvastatin 20 milliGRAM(s) Oral at bedtime  docusate sodium 100 milliGRAM(s) Oral three times a day  ferrous    sulfate 325 milliGRAM(s) Oral three times a day with meals  folic acid 1 milliGRAM(s) Oral daily  metoprolol tartrate 100 milliGRAM(s) Oral daily  multivitamin 1 Tablet(s) Oral daily  pantoprazole    Tablet 40 milliGRAM(s) Oral before breakfast  valsartan 80 milliGRAM(s) Oral daily    MEDICATIONS  (PRN):  ALPRAZolam 0.25 milliGRAM(s) Oral daily PRN ANXIETY  HYDROmorphone   Tablet 2 milliGRAM(s) Oral every 6 hours PRN Moderate Pain (4 - 6)  HYDROmorphone  Injectable 0.5 milliGRAM(s) IV Push every 6 hours PRN Severe Pain (7 - 10)  ondansetron Injectable 4 milliGRAM(s) IV Push every 6 hours PRN Nausea and/or Vomiting  traMADol 50 milliGRAM(s) Oral every 6 hours PRN Mild Pain (1 - 3)      Allergies    penicillin (Rash)    Intolerances      REVIEW OF SYSTEMS:  CONSTITUTIONAL: No weakness, no fevers   EYES/ENT: No visual changes  NECK: No pain or stiffness  RESPIRATORY: No shortness of breath  CARDIOVASCULAR: No chest pain or palpitations  GASTROINTESTINAL: No abdominal pain  GENITOURINARY: No hematuria  NEUROLOGICAL: No weakness  SKIN: No rash  All other review of systems is negative unless indicated above    VITAL SIGNS:   Vital Signs Last 24 Hrs  T(C): 36.9 (19 Sep 2019 07:31), Max: 38.1 (18 Sep 2019 15:46)  T(F): 98.5 (19 Sep 2019 07:31), Max: 100.6 (18 Sep 2019 15:46)  HR: 97 (19 Sep 2019 09:23) (85 - 108)  BP: 114/77 (19 Sep 2019 07:31) (92/65 - 127/78)  BP(mean): --  RR: 19 (19 Sep 2019 07:31) (18 - 19)  SpO2: 100% (19 Sep 2019 07:31) (94% - 100%)  I&O's Summary    18 Sep 2019 07:  -  19 Sep 2019 07:00  --------------------------------------------------------  IN: 240 mL / OUT: 0 mL / NET: 240 mL    19 Sep 2019 07:  -  19 Sep 2019 10:24  --------------------------------------------------------  IN: 120 mL / OUT: 0 mL / NET: 120 mL      PHYSICAL EXAM:  Constitutional: NAD  Neurological: Alert and oriented  HEENT: EOMI, no JVD  Cardiovascular: S1 and S2, irr  Pulmonary: breath sounds b/l mild end exp wheeze  Gastrointestinal: Bowel Sounds present, soft, nontender  Ext: peripheral edema, none  Skin: No rashes, No cyanosis.  Psych:  Mood & affect appropriate   LABS: All Labs Reviewed:                        10.  )-----------( 113      ( 19 Sep 2019 06:19 )             30.9     -    140  |  104  |  14  ----------------------------<  99  3.9   |  29  |  0.74    Ca    8.3<L>      19 Sep 2019 06:19  Phos  2.3       Mg     2.1             65 y/o male, PMH of HTN, Afib, LBBB, TAVR, Pacemaker 2019, with c/o left hip pain, bothering him for about 7 months. PT done for a while, takes Motrin or Tylenol for pain as needed. Had right knee replacement in .     s/p left hip replacement    events noted- tachy last pm, asymptomatic (afib w RVR, hx of chronic afib)  feels well presently s/p lasix with excellent urine outpt  No CP, No SOB  Cont Eliquis, ASA, statin, BB  F/U dr Reyes 2 weeks, [phone]
Patient is a 64y old  Male who presents with a chief complaint of Left hip replacement. (03 Sep 2019 14:48)  feels quite well presently, without complaints    INTERVAL HPI/OVERNIGHT EVENTS:  T(C): 36.8 (09-16-19 @ 20:39), Max: 36.8 (09-16-19 @ 07:42)  HR: 94 (09-16-19 @ 20:39) (83 - 103)  BP: 97/72 (09-16-19 @ 20:39) (90/50 - 120/81)  RR: 18 (09-16-19 @ 20:39) (12 - 20)  SpO2: 100% (09-16-19 @ 20:39) (95% - 100%)  Wt(kg): --  I&O's Summary    16 Sep 2019 07:01  -  16 Sep 2019 22:00  --------------------------------------------------------  IN: 440 mL / OUT: 450 mL / NET: -10 mL        PAST MEDICAL & SURGICAL HISTORY:  Stenosis of right carotid artery  Obesity: BMI 36.5 on 12/1/17  Aortic stenosis: s/p AVR - Bovine 2013  CAD (coronary artery disease): s/p CABG x 3 2009 at [facility]  A-fib: s/p cardiac ablation 3/2016  Gout  Hyperlipidemia  Hypertension  Pacemaker  H/O carotid endarterectomy: right carotid  H/O aortic valve replacement: bovine, 9/26/2013  S/P TKR (total knee replacement): right, 8/2011  3-vessel coronary artery disease: CABG x 3 on 8/2009      SOCIAL HISTORY  Alcohol: social   Tobacco: quit 1988  Illicit substance use: neg      FAMILY HISTORY: NC    Home Medications:  allopurinol 300 mg oral tablet: 1 tab(s) orally once a day (16 Sep 2019 07:42)  Ambien 10 mg oral tablet: 1 tab(s) orally once a day (at bedtime) (16 Sep 2019 07:42)  aspirin 81 mg oral delayed release tablet: 1 tab(s) orally once a day will continue during blair-op period (16 Sep 2019 07:42)  Crestor 5 mg oral tablet: 1 tab(s) orally once a day (at bedtime) (16 Sep 2019 07:42)  Eliquis 5 mg oral tablet: 1 tab(s) orally 2 times a day (16 Sep 2019 07:42)  hydroCHLOROthiazide 12.5 mg oral tablet: 1 tab(s) orally once a day (16 Sep 2019 07:42)  metoprolol tartrate 100 mg oral tablet:  (16 Sep 2019 07:42)  valsartan 80 mg oral tablet: 1 tab(s) orally once a day (16 Sep 2019 07:42)  Xanax 0.25 mg oral tablet: 1 tab(s) orally once a day, As Needed (16 Sep 2019 07:42)  zolpidem 10 mg oral tablet: 1 tab(s) orally once a day (at bedtime) (16 Sep 2019 07:42)        LABS:                        12.0   9.74  )-----------( 130      ( 16 Sep 2019 13:42 )             35.5               CAPILLARY BLOOD GLUCOSE      POCT Blood Glucose.: 140 mg/dL (16 Sep 2019 12:44)  POCT Blood Glucose.: 106 mg/dL (16 Sep 2019 07:43)            MEDICATIONS  (STANDING):  allopurinol 300 milliGRAM(s) Oral daily  ascorbic acid 500 milliGRAM(s) Oral two times a day  aspirin enteric coated 81 milliGRAM(s) Oral daily  atorvastatin 20 milliGRAM(s) Oral at bedtime  clindamycin IVPB 900 milliGRAM(s) IV Intermittent every 8 hours  docusate sodium 100 milliGRAM(s) Oral three times a day  ferrous    sulfate 325 milliGRAM(s) Oral three times a day with meals  folic acid 1 milliGRAM(s) Oral daily  lactated ringers. 1000 milliLiter(s) (75 mL/Hr) IV Continuous <Continuous>  metoprolol tartrate 100 milliGRAM(s) Oral daily  multivitamin 1 Tablet(s) Oral daily  pantoprazole    Tablet 40 milliGRAM(s) Oral before breakfast    MEDICATIONS  (PRN):  ALPRAZolam 0.25 milliGRAM(s) Oral daily PRN ANXIETY  HYDROmorphone   Tablet 2 milliGRAM(s) Oral every 6 hours PRN Moderate Pain (4 - 6)  HYDROmorphone  Injectable 0.5 milliGRAM(s) IV Push every 6 hours PRN Severe Pain (7 - 10)  ondansetron Injectable 4 milliGRAM(s) IV Push every 6 hours PRN Nausea and/or Vomiting  traMADol 50 milliGRAM(s) Oral every 6 hours PRN Mild Pain (1 - 3)      REVIEW OF SYSTEMS:  CONSTITUTIONAL: No fever, weight loss, or fatigue  EYES: No eye pain, visual disturbances, or discharge  ENMT:  No difficulty hearing, tinnitus, vertigo; No sinus or throat pain  NECK: No pain or stiffness  RESPIRATORY: No cough, wheezing, chills or hemoptysis; No shortness of breath  CARDIOVASCULAR: No chest pain, palpitations, dizziness, or leg swelling  GASTROINTESTINAL: No abdominal or epigastric pain. No nausea, vomiting, or hematemesis; No diarrhea or constipation. No melena or hematochezia.  GENITOURINARY: No dysuria, frequency, hematuria, or incontinence  NEUROLOGICAL: No headaches, memory loss, loss of strength, numbness, or tremors  SKIN: No itching, burning, rashes, or lesions   LYMPH NODES: No enlarged glands  ENDOCRINE: No heat or cold intolerance; No hair loss  MUSCULOSKELETAL: chronic left hip pain  PSYCHIATRIC: No depression, anxiety, mood swings, or difficulty sleeping  HEME/LYMPH: No easy bruising, or bleeding gums  ALLERY AND IMMUNOLOGIC: No hives or eczema    RADIOLOGY & ADDITIONAL TESTS:    Imaging Personally Reviewed:  [ ] YES  [ ] NO    Consultant(s) Notes Reviewed:  [ ] YES  [ ] NO        PHYSICAL EXAM:  GENERAL: NAD, well-groomed, well-developed  HEAD:  Atraumatic, Normocephalic  EYES: EOMI, PERRLA, conjunctiva and sclera clear  ENMT: No tonsillar erythema, exudates, or enlargement; Moist mucous membranes, Good dentition, No lesions  NECK: Supple, No JVD, Normal thyroid  NERVOUS SYSTEM:  Alert & Oriented X3, Good concentration; Motor Strength 5/5 B/L upper and lower extremities; DTRs 2+ intact and symmetric  CHEST/LUNG: Clear to percussion bilaterally; No rales, rhonchi, wheezing, or rubs  HEART: Regular rate and rhythm; No murmurs, rubs, or gallops  ABDOMEN: Soft, Nontender, Nondistended; Bowel sounds present  EXTREMITIES:  2+ Peripheral Pulses, No clubbing, cyanosis, or edema  LYMPH: No lymphadenopathy noted  SKIN: No rashes or lesions    Care Discussed with Consultants/Other Providers [ ] YES  [ ] NO
Date/Time Patient Seen:  		  Referring MD:   Data Reviewed	       Patient is a 64y old  Male who presents with a chief complaint of Left hip replacement. (03 Sep 2019 14:48)      Subjective/HPI    in bed  seen and examined  asked to see pt for hypoxemia  on o2 support  extensive cardiac hx      63 y/o male, PMH of HTN, Afib, LBBB, TAVR, Pacemaker 2019, with c/o left hip pain, bothering him for about 7 months. PT done for a while, takes Motrin or Tylenol for pain as needed. Had right knee replacement in . Was seen by Dr Pavon about a month ago, has severe arthritis of left hip. Scheduled for left hip replacement. Pre op testing today.    Allergies/Medications:       PAST SURGICAL HISTORY:  3-vessel coronary artery disease CABG x 3 on 2009    H/O aortic valve replacement bovine, 2013    H/O carotid endarterectomy right carotid    S/P TKR (total knee replacement) right, 2011.     FAMILY HISTORY:  Family history of pacemaker, Mother:   FH: pulmonary embolism, Father: .     Social History:  · Marital Status	  · Occupation	MTA  retired  · Lives With	children; spouse     Substance Use History:  · Substance Use	caffeine  · Caffeine Type	coffee; decaff     Alcohol Use History:  · Have you ever consumed alcohol	yes...  · Alcohol Type	wine; liquor  · Alcohol Frequency	4 or more times/wk  · 1. Have you felt you ought to CUT down on your drinking?	no  · 2. Have people ANNOYED you by criticizing your drinking?	no  · 3. Have you ever felt bad or GUILTY about your drinking?	no  · 4. Have you ever needed an "EYE OPENER", a drink first thing in the morning to steady your nerves or get rid of a hangover?	no     Tobacco Usage:  · Tobacco Usage: Former smoker  · Tobacco Type: cigarettes  · Number of Packs per Day: 1  · Number of yrs: 20  · Pack yrs: 20  · Longest Period Tobacco-Free: quit        PAST MEDICAL & SURGICAL HISTORY:  Stenosis of right carotid artery  Obesity: BMI 36.5 on 17  Aortic stenosis: s/p AVR - Bovine   CAD (coronary artery disease): s/p CABG x 3  at [facility]  A-fib: s/p cardiac ablation 3/2016  Gout  Hyperlipidemia  Hypertension  Pacemaker  H/O carotid endarterectomy: right carotid  H/O aortic valve replacement: bovine, 2013  S/P TKR (total knee replacement): right, 2011  3-vessel coronary artery disease: CABG x 3 on 2009        Medication list         MEDICATIONS  (STANDING):  acetaminophen   Tablet .. 1000 milliGRAM(s) Oral every 6 hours  allopurinol 300 milliGRAM(s) Oral daily  apixaban 2.5 milliGRAM(s) Oral two times a day  ascorbic acid 500 milliGRAM(s) Oral two times a day  aspirin enteric coated 81 milliGRAM(s) Oral daily  atorvastatin 20 milliGRAM(s) Oral at bedtime  docusate sodium 100 milliGRAM(s) Oral three times a day  ferrous    sulfate 325 milliGRAM(s) Oral three times a day with meals  folic acid 1 milliGRAM(s) Oral daily  lactated ringers. 1000 milliLiter(s) (75 mL/Hr) IV Continuous <Continuous>  metoprolol tartrate 100 milliGRAM(s) Oral daily  multivitamin 1 Tablet(s) Oral daily  pantoprazole    Tablet 40 milliGRAM(s) Oral before breakfast    MEDICATIONS  (PRN):  ALPRAZolam 0.25 milliGRAM(s) Oral daily PRN ANXIETY  HYDROmorphone   Tablet 2 milliGRAM(s) Oral every 6 hours PRN Moderate Pain (4 - 6)  HYDROmorphone  Injectable 0.5 milliGRAM(s) IV Push every 6 hours PRN Severe Pain (7 - 10)  ondansetron Injectable 4 milliGRAM(s) IV Push every 6 hours PRN Nausea and/or Vomiting  traMADol 50 milliGRAM(s) Oral every 6 hours PRN Mild Pain (1 - 3)         Vitals log        ICU Vital Signs Last 24 Hrs  T(C): 36.8 (17 Sep 2019 07:52), Max: 37.2 (16 Sep 2019 23:40)  T(F): 98.2 (17 Sep 2019 07:52), Max: 99 (16 Sep 2019 23:40)  HR: 113 (17 Sep 2019 07:52) (83 - 113)  BP: 124/91 (17 Sep 2019 07:52) (95/65 - 124/91)  BP(mean): --  ABP: --  ABP(mean): --  RR: 18 (17 Sep 2019 07:52) (12 - 20)  SpO2: 91% (17 Sep 2019 07:52) (91% - 100%)           Input and Output:  I&O's Detail    16 Sep 2019 07:  -  17 Sep 2019 07:00  --------------------------------------------------------  IN:    lactated ringers.: 200 mL    Oral Fluid: 340 mL    Solution: 100 mL    Solution: 100 mL  Total IN: 740 mL    OUT:    Voided: 750 mL  Total OUT: 750 mL    Total NET: -10 mL          Lab Data                        11.4   10.14 )-----------( 135      ( 17 Sep 2019 06:32 )             34.5         140  |  103  |  12  ----------------------------<  119<H>  4.5   |  29  |  0.92    Ca    8.6      17 Sep 2019 06:32        family hx - father - ashd -       Review of Systems	  post op    Objective     Physical Examination  heart s1s2  lung dec BS  abd soft  obese  head at  cn grossly int        Pertinent Lab findings & Imaging      Blancas:  NO   Adequate UO     I&O's Detail    16 Sep 2019 07:  -  17 Sep 2019 07:00  --------------------------------------------------------  IN:    lactated ringers.: 200 mL    Oral Fluid: 340 mL    Solution: 100 mL    Solution: 100 mL  Total IN: 740 mL    OUT:    Voided: 750 mL  Total OUT: 750 mL    Total NET: -10 mL               Discussed with:     Cultures:	        Radiology  EXAM:  XR CHEST PORTABLE URGENT 1V                            PROCEDURE DATE:  2019          INTERPRETATION:  Clinical indication:  eval desat episodes, hypoxemia -    Technique: XR CHEST URGENT portable AP    Comparison:9/3/2019.    Findings:  Lines: Left subclavian approach pacemaker with leads in the right atrium   and right ventricle.    Heart/Mediastinum/Lungs: Cardiomegaly. Mild pulmonary vascular   congestion. Median sternotomy wires and multiple mediastinal surgical   clips.    Impression:    As above.                CRISTAL PEÑA M.D. ATTENDING RADIOLOGIST  This document has been electronically signed. Sep 17 2019  9:00AM
Patient is a 64y old  Male who presents with a chief complaint of Left hip replacement. (03 Sep 2019 14:48)      BRIEF HOSPITAL COURSE:   65 y/o male, PMH of HTN, Afib, LBBB, TAVR, Pacemaker 4/2019, with c/o left hip pain, bothering him for about 7 months. Pt is POD#2 for s/p L hip replacement. Rapid response called for afib with RVR, BP stable.  Pt seen and examined at bedise not currently offering any complaints.       PAST MEDICAL & SURGICAL HISTORY:  Stenosis of right carotid artery  Obesity: BMI 36.5 on 12/1/17  Aortic stenosis: s/p AVR - Bovine 2013  CAD (coronary artery disease): s/p CABG x 3 2009 at [facility]  A-fib: s/p cardiac ablation 3/2016  Gout  Hyperlipidemia  Hypertension  Pacemaker  H/O carotid endarterectomy: right carotid  H/O aortic valve replacement: bovine, 9/26/2013  S/P TKR (total knee replacement): right, 8/2011  3-vessel coronary artery disease: CABG x 3 on 8/2009      Review of Systems:  CONSTITUTIONAL: No fever, chills, or fatigue  EYES: No eye pain, visual disturbances, or discharge  ENMT:  No difficulty hearing, tinnitus, vertigo; No sinus or throat pain  NECK: No pain or stiffness  RESPIRATORY: No cough, wheezing, chills or hemoptysis; No shortness of breath  CARDIOVASCULAR: No chest pain, palpitations, dizziness, or leg swelling  GASTROINTESTINAL: No abdominal or epigastric pain. No nausea, vomiting, or hematemesis; No diarrhea or constipation. No melena or hematochezia.  GENITOURINARY: No dysuria, frequency, hematuria, or incontinence  NEUROLOGICAL: No headaches, memory loss, loss of strength, numbness, or tremors  SKIN: No itching, burning, rashes, or lesions   MUSCULOSKELETAL: No joint pain or swelling; No muscle, back, or extremity pain  PSYCHIATRIC: No depression, anxiety, mood swings, or difficulty sleeping      Medications:  metoprolol tartrate 100 milliGRAM(s) Oral daily  valsartan 80 milliGRAM(s) Oral daily  acetaminophen   Tablet .. 1000 milliGRAM(s) Oral every 6 hours  ALPRAZolam 0.25 milliGRAM(s) Oral daily PRN  HYDROmorphone   Tablet 2 milliGRAM(s) Oral every 6 hours PRN  HYDROmorphone  Injectable 0.5 milliGRAM(s) IV Push every 6 hours PRN  ondansetron Injectable 4 milliGRAM(s) IV Push every 6 hours PRN  traMADol 50 milliGRAM(s) Oral every 6 hours PRN  apixaban 5 milliGRAM(s) Oral every 12 hours  aspirin enteric coated 81 milliGRAM(s) Oral daily  docusae sodium 100 milliGRAM(s) Oral three times a day  pantoprazole    Tablet 40 milliGRAM(s) Oral before breakfast  allopurinol 300 milliGRAM(s) Oral daily  atorvastatin 20 milliGRAM(s) Oral at bedtime  ascorbic acid 500 milliGRAM(s) Oral two times a day  ferrous    sulfate 325 milliGRAM(s) Oral three times a day with meals  folic acid 1 milliGRAM(s) Oral daily  multivitamin 1 Tablet(s) Oral daily        ICU Vital Signs Last 24 Hrs  T(C): 37.7 (18 Sep 2019 23:30), Max: 38.1 (18 Sep 2019 15:46)  T(F): 99.9 (18 Sep 2019 23:30), Max: 100.6 (18 Sep 2019 15:46)  HR: 85 (18 Sep 2019 23:30) (79 - 108)  BP: 100/60 (18 Sep 2019 23:43) (92/65 - 127/78)  RR: 18 (18 Sep 2019 23:30) (18 - 20)  SpO2: 100% (18 Sep 2019 23:30) (94% - 100%)          I&O's Detail    17 Sep 2019 07:01  -  18 Sep 2019 07:00  --------------------------------------------------------  IN:  Total IN: 0 mL    OUT:    Voided: 650 mL  Total OUT: 650 mL    Total NET: -650 mL      18 Sep 2019 07:01  -  19 Sep 2019 01:49  --------------------------------------------------------  IN:    Oral Fluid: 240 mL  Total IN: 240 mL    OUT:  Total OUT: 0 mL    Total NET: 240 mL            LABS:                        11.2   9.50  )-----------( 116      ( 18 Sep 2019 07:21 )             33.2     09-17    140  |  103  |  12  ----------------------------<  119<H>  4.5   |  29  |  0.92    Ca    8.6      17 Sep 2019 06:32  Phos  2.7     09-18  Mg     2.0     09-18            CAPILLARY BLOOD GLUCOSE      POCT Blood Glucose.: 100 mg/dL (18 Sep 2019 22:07)        CULTURES:      Physical Examination:    General: No acute distress.  Alert, oriented, interactive, nonfocal    HEENT: Pupils equal, reactive to light.  Symmetric.    PULM: Clear to auscultation bilaterally, no significant sputum production    CVS: afib with rvr, no murmurs, rubs, or gallops    ABD: Soft, nondistended, nontender, normoactive bowel sounds, no masses    EXT: No edema, nontender    SKIN: Warm and well perfused, no rashes noted.

## 2019-09-19 NOTE — PROGRESS NOTE ADULT - PROBLEM SELECTOR PLAN 1
events noted, rate presently controlled  eliquis  NSVT noted - lytes OK, cont beta blocker  Pt advised to follow up cardiology, dr Reyes as outpt next week

## 2019-09-19 NOTE — PROGRESS NOTE ADULT - SUBJECTIVE AND OBJECTIVE BOX
Patient is a 64y old  Male who presents with a chief complaint of Left hip replacement. (03 Sep 2019 14:48)  events noted- tachy last pm, asymptomatic (afib w RVR, hx of chronic afib)  feels well presently    INTERVAL HPI/OVERNIGHT EVENTS:  T(C): 36.9 (09-19-19 @ 07:31), Max: 38.1 (09-18-19 @ 15:46)  HR: 93 (09-19-19 @ 07:31) (85 - 108)  BP: 114/77 (09-19-19 @ 07:31) (92/65 - 127/78)  RR: 19 (09-19-19 @ 07:31) (18 - 19)  SpO2: 100% (09-19-19 @ 07:31) (94% - 100%)  Wt(kg): --  I&O's Summary    18 Sep 2019 07:01  -  19 Sep 2019 07:00  --------------------------------------------------------  IN: 240 mL / OUT: 0 mL / NET: 240 mL    19 Sep 2019 07:01  -  19 Sep 2019 09:08  --------------------------------------------------------  IN: 120 mL / OUT: 0 mL / NET: 120 mL        LABS:                        10.5   7.68  )-----------( 113      ( 19 Sep 2019 06:19 )             30.9     09-19    140  |  104  |  14  ----------------------------<  99  3.9   |  29  |  0.74    Ca    8.3<L>      19 Sep 2019 06:19  Phos  2.3     09-19  Mg     2.1     09-19          CAPILLARY BLOOD GLUCOSE      POCT Blood Glucose.: 100 mg/dL (18 Sep 2019 22:07)            MEDICATIONS  (STANDING):  acetaminophen   Tablet .. 1000 milliGRAM(s) Oral every 6 hours  allopurinol 300 milliGRAM(s) Oral daily  apixaban 5 milliGRAM(s) Oral every 12 hours  ascorbic acid 500 milliGRAM(s) Oral two times a day  aspirin enteric coated 81 milliGRAM(s) Oral daily  atorvastatin 20 milliGRAM(s) Oral at bedtime  docusate sodium 100 milliGRAM(s) Oral three times a day  ferrous    sulfate 325 milliGRAM(s) Oral three times a day with meals  folic acid 1 milliGRAM(s) Oral daily  metoprolol tartrate 100 milliGRAM(s) Oral daily  multivitamin 1 Tablet(s) Oral daily  pantoprazole    Tablet 40 milliGRAM(s) Oral before breakfast  valsartan 80 milliGRAM(s) Oral daily    MEDICATIONS  (PRN):  ALPRAZolam 0.25 milliGRAM(s) Oral daily PRN ANXIETY  HYDROmorphone   Tablet 2 milliGRAM(s) Oral every 6 hours PRN Moderate Pain (4 - 6)  HYDROmorphone  Injectable 0.5 milliGRAM(s) IV Push every 6 hours PRN Severe Pain (7 - 10)  ondansetron Injectable 4 milliGRAM(s) IV Push every 6 hours PRN Nausea and/or Vomiting  traMADol 50 milliGRAM(s) Oral every 6 hours PRN Mild Pain (1 - 3)      REVIEW OF SYSTEMS:  CONSTITUTIONAL: No fever, weight loss, or fatigue  EYES: No eye pain, visual disturbances, or discharge  ENMT:  No difficulty hearing, tinnitus, vertigo; No sinus or throat pain  NECK: No pain or stiffness  RESPIRATORY: No cough, wheezing, chills or hemoptysis; No shortness of breath  CARDIOVASCULAR: No chest pain, palpitations, dizziness, or leg swelling  GASTROINTESTINAL: No abdominal or epigastric pain. No nausea, vomiting, or hematemesis; No diarrhea or constipation. No melena or hematochezia.  GENITOURINARY: No dysuria, frequency, hematuria, or incontinence  NEUROLOGICAL: No headaches, memory loss, loss of strength, numbness, or tremors  SKIN: No itching, burning, rashes, or lesions   LYMPH NODES: No enlarged glands  ENDOCRINE: No heat or cold intolerance; No hair loss  MUSCULOSKELETAL: No joint pain or swelling; No muscle, back, or extremity pain  PSYCHIATRIC: No depression, anxiety, mood swings, or difficulty sleeping  HEME/LYMPH: No easy bruising, or bleeding gums  ALLERY AND IMMUNOLOGIC: No hives or eczema    RADIOLOGY & ADDITIONAL TESTS:    Imaging Personally Reviewed:  [ ] YES  [ ] NO    Consultant(s) Notes Reviewed:  [ ] YES  [ ] NO    PHYSICAL EXAM:  GENERAL: NAD, well-groomed, well-developed  HEAD:  Atraumatic, Normocephalic  EYES: EOMI, PERRLA, conjunctiva and sclera clear  ENMT: No tonsillar erythema, exudates, or enlargement; Moist mucous membranes, Good dentition, No lesions  NECK: Supple, No JVD, Normal thyroid  NERVOUS SYSTEM:  Alert & Oriented X3, Good concentration; Motor Strength 5/5 B/L upper and lower extremities; DTRs 2+ intact and symmetric  CHEST/LUNG: Clear to percussion bilaterally; No rales, rhonchi, wheezing, or rubs  HEART: Regular rate and rhythm; No murmurs, rubs, or gallops  ABDOMEN: Soft, Nontender, Nondistended; Bowel sounds present  EXTREMITIES:  2+ Peripheral Pulses, No clubbing, cyanosis, or edema  LYMPH: No lymphadenopathy noted  SKIN: No rashes or lesions    Care Discussed with Consultants/Other Providers [ ] YES  [ ] NO

## 2019-09-19 NOTE — DISCHARGE NOTE NURSING/CASE MANAGEMENT/SOCIAL WORK - PATIENT PORTAL LINK FT
You can access the FollowMyHealth Patient Portal offered by [facility] by registering at the following website: [email or web address]. By joining [facility]’s FollowMyHealth portal, you will also be able to view your health information using other applications (apps) compatible with our system.

## 2019-09-19 NOTE — PROGRESS NOTE ADULT - SUBJECTIVE AND OBJECTIVE BOX
Date/Time Patient Seen:  		  Referring MD:   Data Reviewed	       Patient is a 64y old  Male who presents with a chief complaint of Left hip replacement. (03 Sep 2019 14:48)      Subjective/HPI     PAST MEDICAL & SURGICAL HISTORY:  Stenosis of right carotid artery  Obesity: BMI 36.5 on 12/1/17  Aortic stenosis: s/p AVR - Bovine 2013  CAD (coronary artery disease): s/p CABG x 3 2009 at [facility]  A-fib: s/p cardiac ablation 3/2016  Gout  Hyperlipidemia  Hypertension  Pacemaker  H/O carotid endarterectomy: right carotid  H/O aortic valve replacement: bovine, 9/26/2013  S/P TKR (total knee replacement): right, 8/2011  3-vessel coronary artery disease: CABG x 3 on 8/2009        Medication list         MEDICATIONS  (STANDING):  acetaminophen   Tablet .. 1000 milliGRAM(s) Oral every 6 hours  allopurinol 300 milliGRAM(s) Oral daily  apixaban 5 milliGRAM(s) Oral every 12 hours  ascorbic acid 500 milliGRAM(s) Oral two times a day  aspirin enteric coated 81 milliGRAM(s) Oral daily  atorvastatin 20 milliGRAM(s) Oral at bedtime  docusate sodium 100 milliGRAM(s) Oral three times a day  ferrous    sulfate 325 milliGRAM(s) Oral three times a day with meals  folic acid 1 milliGRAM(s) Oral daily  metoprolol tartrate 100 milliGRAM(s) Oral daily  multivitamin 1 Tablet(s) Oral daily  pantoprazole    Tablet 40 milliGRAM(s) Oral before breakfast  valsartan 80 milliGRAM(s) Oral daily    MEDICATIONS  (PRN):  ALPRAZolam 0.25 milliGRAM(s) Oral daily PRN ANXIETY  HYDROmorphone   Tablet 2 milliGRAM(s) Oral every 6 hours PRN Moderate Pain (4 - 6)  HYDROmorphone  Injectable 0.5 milliGRAM(s) IV Push every 6 hours PRN Severe Pain (7 - 10)  ondansetron Injectable 4 milliGRAM(s) IV Push every 6 hours PRN Nausea and/or Vomiting  traMADol 50 milliGRAM(s) Oral every 6 hours PRN Mild Pain (1 - 3)         Vitals log        ICU Vital Signs Last 24 Hrs  T(C): 36.8 (19 Sep 2019 05:10), Max: 38.1 (18 Sep 2019 15:46)  T(F): 98.2 (19 Sep 2019 05:10), Max: 100.6 (18 Sep 2019 15:46)  HR: 99 (19 Sep 2019 05:10) (82 - 108)  BP: 112/78 (19 Sep 2019 05:10) (92/65 - 127/78)  BP(mean): --  ABP: --  ABP(mean): --  RR: 18 (19 Sep 2019 05:10) (18 - 19)  SpO2: 100% (19 Sep 2019 05:10) (94% - 100%)           Input and Output:  I&O's Detail    17 Sep 2019 07:01  -  18 Sep 2019 07:00  --------------------------------------------------------  IN:  Total IN: 0 mL    OUT:    Voided: 650 mL  Total OUT: 650 mL    Total NET: -650 mL      18 Sep 2019 07:01  -  19 Sep 2019 06:14  --------------------------------------------------------  IN:    Oral Fluid: 240 mL  Total IN: 240 mL    OUT:  Total OUT: 0 mL    Total NET: 240 mL          Lab Data                        11.2   9.50  )-----------( 116      ( 18 Sep 2019 07:21 )             33.2     09-17    140  |  103  |  12  ----------------------------<  119<H>  4.5   |  29  |  0.92    Ca    8.6      17 Sep 2019 06:32  Phos  2.7     09-18  Mg     2.0     09-18              Review of Systems	      Objective     Physical Examination  heart s1s2  lung dec BS  abd soft  obese  cn grossly int        Pertinent Lab findings & Imaging      Magalis:  NO   Adequate UO     I&O's Detail    17 Sep 2019 07:01  -  18 Sep 2019 07:00  --------------------------------------------------------  IN:  Total IN: 0 mL    OUT:    Voided: 650 mL  Total OUT: 650 mL    Total NET: -650 mL      18 Sep 2019 07:01  -  19 Sep 2019 06:14  --------------------------------------------------------  IN:    Oral Fluid: 240 mL  Total IN: 240 mL    OUT:  Total OUT: 0 mL    Total NET: 240 mL               Discussed with:     Cultures:	        Radiology

## 2019-09-19 NOTE — CHART NOTE - NSCHARTNOTEFT_GEN_A_CORE
RAPID RESPONSE FOLLOW UP NOTE    Patient seen and examined at bedside. RR called @ 2205 for ABDOUL . Pt reassessed around 001.  Patient states he is still feeling some palpitations and anxiety. Denies headaches, dizziness, chest pain, abdominal pain, n/v/d    Vital Signs Last 24 Hrs  T(C): 37.7 (18 Sep 2019 23:30), Max: 38.1 (18 Sep 2019 15:46)  T(F): 99.9 (18 Sep 2019 23:30), Max: 100.6 (18 Sep 2019 15:46)  HR: 85 (18 Sep 2019 23:30) (79 - 108)  BP: 100/60 (18 Sep 2019 23:43) (92/65 - 127/78)  BP(mean): --  RR: 18 (18 Sep 2019 23:30) (18 - 20)  SpO2: 100% (18 Sep 2019 23:30) (94% - 100%)    Physical Exam:  General: Well developed, well nourished, in no acute distress  HEENT: NCAT, PERRLA, EOMI bl, moist mucous membranes   Neurology: A&Ox3, nonfocal, CN II-XII grossly intact, sensation intact  Respiratory: CTA B/L, No wheezing, rales, or rhonchi  CV: tachycardic, irregular rate and rhythm, S1/S2 present, no murmurs, rubs, or gallops  Abdominal: Soft, nontender, non-distended, normoactive bowel sounds  Extremities: No C/C/E, peripheral pulses present  Skin: warm, dry, normal color, no obvious rash or abnormal lesions    Assessment/Plan:  65 y/o male, PMH of HTN, Afib, LBBB, TAVR, Pacemaker 4/2019, with c/o left hip pain, bothering him for about 7 months. s/p left hip replacement with Dr Pavon, POD 2    s/p RRT called for rapid AFib with rate of 165    -pt. with continued heart rates in the 130s, pt. admits to feelings of palpitations and anxiety  -BP on manual cuff of 98/60  -2nd dose of IV lopressor 5mg to be given now and observe on bedside monitor for 1 hour per protocol  -pt. to contniue receiving lopressor 100mg once a day and eliquis  -pt endorsing significant anxiety, receives .25 xanax as needed at home, will give one time dose now  -Discussed with Dr. Randall, ICU PA, senior resident and patient, who agreed with above plan. Call placed to Dr. Perlman, who is aware and agrees w/ plan.  -Will continue to follow, RN to call if any changes.    Dr. Maciel  [phone]. RAPID RESPONSE FOLLOW UP NOTE    Patient seen and examined at bedside. RR called @ 2205 for ABDOUL . Pt reassessed around 001.  Patient states he is still feeling some palpitations and anxiety. Denies headaches, dizziness, chest pain, abdominal pain, n/v/d    Vital Signs Last 24 Hrs  T(C): 37.7 (18 Sep 2019 23:30), Max: 38.1 (18 Sep 2019 15:46)  T(F): 99.9 (18 Sep 2019 23:30), Max: 100.6 (18 Sep 2019 15:46)  HR: 85 (18 Sep 2019 23:30) (79 - 108)  BP: 100/60 (18 Sep 2019 23:43) (92/65 - 127/78)  BP(mean): --  RR: 18 (18 Sep 2019 23:30) (18 - 20)  SpO2: 100% (18 Sep 2019 23:30) (94% - 100%)    Physical Exam:  General: Well developed, well nourished, in no acute distress  HEENT: NCAT, PERRLA, EOMI bl, moist mucous membranes   Neurology: A&Ox3, nonfocal, CN II-XII grossly intact, sensation intact  Respiratory: CTA B/L, No wheezing, rales, or rhonchi  CV: tachycardic, irregular rate and rhythm, S1/S2 present, no murmurs, rubs, or gallops  Abdominal: Soft, nontender, non-distended, normoactive bowel sounds  Extremities: No C/C/E, peripheral pulses present  Skin: warm, dry, normal color, no obvious rash or abnormal lesions    Assessment/Plan:  63 y/o male, PMH of HTN, Afib, LBBB, TAVR, Pacemaker 4/2019, with c/o left hip pain, bothering him for about 7 months. s/p left hip replacement with Dr Pavon, POD 2    s/p RRT called for rapid AFib with rate of 165    -pt. with continued heart rates in the 130s, pt. admits to feelings of palpitations and anxiety  -BP on manual cuff of 98/60  -2nd dose of IV lopressor 5mg to be given now and observe on bedside monitor for 1 hour per protocol--noted improvement to 110s  -pt. to continue receiving lopressor 100mg once a day and eliquis  -pt endorsing significant anxiety, receives .25 xanax as needed at home, will give one time dose now  -Discussed with senior resident and patient, who agreed with above plan. Contacted Dr. Perlman, who is aware.  -Will continue to follow, RN to call if any changes.    Dr. Maciel  [phone].

## 2019-09-19 NOTE — CONSULT NOTE ADULT - ASSESSMENT
65 y/o male, PMH of HTN, Afib, LBBB, TAVR, Pacemaker 4/2019, with c/o left hip pain, bothering him for about 7 months.     1. afib with rvr  2. L hip arthroplasty   3.HTN    1. afib with rvr  -BP stable, pt asymptomatic  -would give 1 time stat dose of 5mg lopressor over 1 min   -cont home av elissa blockers  -cont AC     2. L hip arthroplasty   -cont care per ortho    3.HTN  -cont home meds   -titrate BP less then 160 SBP  -hold for HR less then 60    Pt does not need ICU level care at this time. Please reconsult as needed if clinical status changes

## 2019-09-20 RX ORDER — ROSUVASTATIN CALCIUM 5 MG/1
5 TABLET, FILM COATED ORAL
Refills: 0 | Status: ACTIVE | COMMUNITY
Start: 2017-12-19

## 2019-09-20 RX ORDER — ACETAMINOPHEN 500 MG/1
500 TABLET ORAL EVERY 6 HOURS
Refills: 0 | Status: ACTIVE | COMMUNITY
Start: 2019-09-20

## 2019-09-20 RX ORDER — METOPROLOL SUCCINATE 50 MG/1
50 TABLET, EXTENDED RELEASE ORAL
Qty: 270 | Refills: 0 | Status: DISCONTINUED | COMMUNITY
Start: 2017-03-01 | End: 2019-09-20

## 2019-10-01 PROCEDURE — 97535 SELF CARE MNGMENT TRAINING: CPT

## 2019-10-01 PROCEDURE — 88311 DECALCIFY TISSUE: CPT

## 2019-10-01 PROCEDURE — C1713: CPT

## 2019-10-01 PROCEDURE — 85027 COMPLETE CBC AUTOMATED: CPT

## 2019-10-01 PROCEDURE — 36415 COLL VENOUS BLD VENIPUNCTURE: CPT

## 2019-10-01 PROCEDURE — 97530 THERAPEUTIC ACTIVITIES: CPT

## 2019-10-01 PROCEDURE — 84100 ASSAY OF PHOSPHORUS: CPT

## 2019-10-01 PROCEDURE — 97162 PT EVAL MOD COMPLEX 30 MIN: CPT

## 2019-10-01 PROCEDURE — C1776: CPT

## 2019-10-01 PROCEDURE — 83735 ASSAY OF MAGNESIUM: CPT

## 2019-10-01 PROCEDURE — 97116 GAIT TRAINING THERAPY: CPT

## 2019-10-01 PROCEDURE — 88305 TISSUE EXAM BY PATHOLOGIST: CPT

## 2019-10-01 PROCEDURE — 84443 ASSAY THYROID STIM HORMONE: CPT

## 2019-10-01 PROCEDURE — 80048 BASIC METABOLIC PNL TOTAL CA: CPT

## 2019-10-01 PROCEDURE — 83880 ASSAY OF NATRIURETIC PEPTIDE: CPT

## 2019-10-01 PROCEDURE — 71045 X-RAY EXAM CHEST 1 VIEW: CPT

## 2019-10-01 PROCEDURE — 82962 GLUCOSE BLOOD TEST: CPT

## 2019-10-01 PROCEDURE — 97165 OT EVAL LOW COMPLEX 30 MIN: CPT

## 2019-10-01 PROCEDURE — 97112 NEUROMUSCULAR REEDUCATION: CPT

## 2019-10-01 PROCEDURE — 97110 THERAPEUTIC EXERCISES: CPT

## 2019-10-01 PROCEDURE — 73501 X-RAY EXAM HIP UNI 1 VIEW: CPT

## 2021-03-15 NOTE — ED ADULT NURSE NOTE - CHPI ED NUR SYMPTOMS POS
Detail Level: Detailed
Patient Specific Counseling (Will Not Stick From Patient To Patient): Counseled to keep the nail trimmed short and apply the topical steroid daily for 2-3 months until nail is normalized. This may be due to repetitive trauma of the nail.
CHEST PAIN/SHORTNESS OF BREATH

## 2021-03-26 NOTE — DISCHARGE NOTE PROVIDER - HOSPITAL COURSE
dysuria/urinary retention THIS IS A CASE OF A 63 YO MALE  EVALUATED IN THE OFFICE FOR LEFT HIP PAIN SECONDARY TO SEVERE ENDSTAGE OSTEOARTHRITIS.          PAST MEDICAL AND SURGICAL HISTORY:         Stenosis of right carotid artery (I65.21)    Obesity (E66.9): BMI 36.5 on 12/1/17    Aortic stenosis (I35.0): s/p AVR - Bovine 2013    CAD (coronary artery disease) (I25.10): s/p CABG x 3 2009 at [facility]    A-fib (I48.91): s/p cardiac ablation 3/2016    Gout (M10.9)    Hyperlipidemia (E78.5)    Hypertension (I10)    Pacemaker (Z95.0)    H/O carotid endarterectomy (Z98.890): right carotid    H/O aortic valve replacement (Z95.2): bovine, 9/26/2013    S/P TKR (total knee replacement) (Z96.659): right, 8/2011    3-vessel coronary artery disease (I25.10): CABG x 3 on 8/2009        Home Medications:        allopurinol 300 mg oral tablet: 1 tab(s) orally once a day (16 Sep 2019 07:42)    Ambien 10 mg oral tablet: 1 tab(s) orally once a day (at bedtime) (16 Sep 2019 07:42)    aspirin 81 mg oral delayed release tablet: 1 tab(s) orally once a day will continue during blair-op period (16 Sep 2019 07:42)    Crestor 5 mg oral tablet: 1 tab(s) orally once a day (at bedtime) (16 Sep 2019 07:42)    Eliquis 5 mg oral tablet: 1 tab(s) orally 2 times a day (16 Sep 2019 07:42)    hydroCHLOROthiazide 12.5 mg oral tablet: 1 tab(s) orally once a day (16 Sep 2019 07:42)    metoprolol tartrate 100 mg oral tablet:  (16 Sep 2019 07:42)    valsartan 80 mg oral tablet: 1 tab(s) orally once a day (16 Sep 2019 07:42)    Xanax 0.25 mg oral tablet: 1 tab(s) orally once a day, As Needed (16 Sep 2019 07:42)    zolpidem 10 mg oral tablet: 1 tab(s) orally once a day (at bedtime) (16 Sep 2019 07:42)        Allergies    penicillin (Rash)        HOSPITAL COURSE: AFTER THE RISK AND BENEFITS OF SURGICAL INTERVENTION IN DETAILS WERE DISCUSSED WITH THE PATIENT, A CONSENT WAS OBTAINED. AFTER OBTAINING MEDICAL CLEARANCE AND PREOPERATIVE EVALUATION, THE PATIENT WAS TAKEN TO THE OPERATING ROOM ON 09/16/2019 AND THE PATIENT UNDERWENT A LEFT TOTAL HIP REPLACEMENT.         PATIENT HAS A HISTORY OF AFIB AND IS ON ELIQUIS 5 MG BID AT HOME.         POSTOPERATIVE PHASE, THE PATIENT WAS ANTICOAGULATED WITH ELIQUIS INITIALLY AT A LOWER DOSE 2.5 MG BID ON POD # 1 AND SINCE HEMODYNAMICALLY REMAINED STABLE WITH STABLE HEMOGLOBIN AND  HEMATOCRIT  FULL DOSE 5 MG BID WAS RESUMED ON POD # 2. HE WAS GIVEN 24 HOURS OF IV ANTIBIOTICS. A SOCIAL SERVICE CONSULT WAS OBTAINED FOR DISCHARGE PLANNING.  A PHYSICAL THERAPY CONSULT WAS OBTAINED FOR WEIGHT BEARING AS TOLERATED, HIP PRECAUTIONS.  DUE TO ANEMIA OF ACUTE BLOOD LOSS POST OP IRON SUPPLEMENT GIVEN.        ON 09/18/2019 A RAPID RESPONSE WAS CALLED FOR RAPID AFIB , AND HIS RATE RESPONDED TO ADDITIONAL BETA BLOCKER.          HE WAS MEDICALLY CLEARED FOR DISCHARGE .        DISPOSITION : HOME WITH HOME CARE. WEIGHT BEARING AS TOLERATED. FOLLOW UP WITH DR. GREENE NEXT THURSDAY 09/26/2019  CALL [phone] FOR AN APPOINTMENT. KEEP HIP AQUACEL  DRESSING  ON DRY AND CLEAN, IT WILL BE CHANGED OR REMOVED ON YOUR NEXT OFFICE VISIT.

## 2021-09-22 ENCOUNTER — TRANSCRIPTION ENCOUNTER (OUTPATIENT)
Age: 66
End: 2021-09-22

## 2021-10-04 ENCOUNTER — TRANSCRIPTION ENCOUNTER (OUTPATIENT)
Age: 66
End: 2021-10-04

## 2022-01-10 ENCOUNTER — TRANSCRIPTION ENCOUNTER (OUTPATIENT)
Age: 67
End: 2022-01-10

## 2022-03-27 NOTE — CONSULT NOTE ADULT - PROBLEM SELECTOR PROBLEM 5
Nurse to nurse report given to [facility] RN at [address].  AVS also faxed to [phone] per request Osteoarthritis of left hip

## 2022-04-22 NOTE — ED PROVIDER NOTE - NSTIMEPROVIDERCAREINITIATE_GEN_ER
Cholesterol levels are elevated. Please instruct patient to start a low fat/low cholesterol diet, cook with vegetable or olive oil and less red meat to help lower the cholesterol. Follow up in 3 months.   Normal CBC, CMP
21-Mar-2019 22:26

## 2023-01-01 NOTE — H&P PST ADULT - CONSTITUTIONAL DETAILS
[facility]  NOTE      Information:  Baby Girl Alisa Paredes  Gestational Age: 36w0d  YOB: 2024  Time of Birth: 11:8 AM   Birth Weight: 5 lb 10.7 oz (2.571 kg)  Weight Change: -4%  Birth Head Circumference: 32.5 cm (12.8\")  Birth Length: 1' 7\" (0.483 m)      Maternal Information  Name: Caty Martin   Age: 29 years  Parity:     Maternal Prenatal Labs  Blood type:  B positive   GBS: Negative  HIV: Negative  HBsAg: Negative  RPR:  Nonreactive  Rubella:  Immune  GC/Chlamydia:  Unknown  Hepatitis C: Negative    Pregnancy Complications: hx of cornual pregnancy, anxiety, +THC    ROM: at     Delivery Method: , Low Vertical  APGAR One: 8  APGAR Five: 9    Delivery Complications: None      Pulse 130   Temp 98.6 °F (37 °C)   Resp 36   Ht 19\" (48.3 cm) Comment: Filed from Delivery Summary  Wt 5 lb 7.3 oz (2.474 kg)   HC 32.5 cm (12.8\") Comment: Filed from Delivery Summary  BMI 10.62 kg/m²      Physical Exam:     General: Well-developed term infant in no acute distress. Head: Normocephalic with open fontanelles. No facial anomalies present. Eyes: Red reflex present bilaterally. No visible cataracts. Ears: External ears normal. Canals grossly patent. Nose: Nostrils grossly patent without notable airway obstruction or septal deviation. Mouth/Throat: Mucous membranes moist. Palate intact. Oropharynx is clear. Neck: Full passive range of motion. Skin: Faroese spots over the lower back. No visible cyanosis. Cardiovascular: Normal rate, regular rhythm, S1 & S2 normal. No murmur or gallop. Well-perfused. Pulmonary/Chest: Lungs clear bilaterally with good air exchange. No chest deformity. Abdominal: Soft without distention. No palpable masses or organomegaly. Genitourinary: Normal genitalia. Anus patent. Musculoskeletal: Extremities with normal digitation and range of motion. Hips stable. Spine intact. Neurological: Responds appropriately to stimulation. well-nourished/well-developed/well-groomed

## 2023-01-05 ENCOUNTER — NON-APPOINTMENT (OUTPATIENT)
Age: 68
End: 2023-01-05

## 2023-01-05 DIAGNOSIS — U07.1 COVID-19: ICD-10-CM

## 2023-02-11 NOTE — PRE-OP CHECKLIST - DENTURES
He had >1000cc of urine in bladder so cho catheter placed. Will need voiding trial again and continue flomax. Follow up with urology    Blood sugar improved in the emergency department. Continue to closely monitor blood sugars. Decrease lantus to 10 units tonight, continue the sliding scale. May need additional adjusting of his lantus either up or down depending on his blood sugars   no

## 2023-03-03 ENCOUNTER — APPOINTMENT (OUTPATIENT)
Dept: INTERNAL MEDICINE | Facility: CLINIC | Age: 68
End: 2023-03-03
Payer: MEDICARE

## 2023-03-03 PROCEDURE — 99213 OFFICE O/P EST LOW 20 MIN: CPT

## 2023-03-03 NOTE — REVIEW OF SYSTEMS
[Paroxysmal Nocturnal Dyspnea] : paroxysmal nocturnal dyspnea [Dyspnea on Exertion] : dyspnea on exertion [Negative] : ENT

## 2023-03-03 NOTE — HISTORY OF PRESENT ILLNESS
[FreeTextEntry1] : Patient has had increased anxiety due to recent death of his daughter\par Also has chronic dyspnea on exertion related to CHF

## 2023-04-06 ENCOUNTER — APPOINTMENT (OUTPATIENT)
Dept: INTERNAL MEDICINE | Facility: CLINIC | Age: 68
End: 2023-04-06
Payer: MEDICARE

## 2023-04-06 VITALS
WEIGHT: 245 LBS | OXYGEN SATURATION: 97 % | DIASTOLIC BLOOD PRESSURE: 84 MMHG | TEMPERATURE: 98.2 F | SYSTOLIC BLOOD PRESSURE: 123 MMHG | HEIGHT: 69 IN | HEART RATE: 70 BPM | BODY MASS INDEX: 36.29 KG/M2

## 2023-04-06 PROCEDURE — 99213 OFFICE O/P EST LOW 20 MIN: CPT

## 2023-04-06 NOTE — PLAN
[FreeTextEntry1] : Continue present medication\par Return to office in 2 months or as needed\par His Xanax and Ambien renewed

## 2023-04-13 ENCOUNTER — TRANSCRIPTION ENCOUNTER (OUTPATIENT)
Age: 68
End: 2023-04-13

## 2023-04-14 ENCOUNTER — OUTPATIENT (OUTPATIENT)
Dept: OUTPATIENT SERVICES | Facility: HOSPITAL | Age: 68
LOS: 1 days | End: 2023-04-14
Payer: MEDICARE

## 2023-04-14 DIAGNOSIS — Z95.2 PRESENCE OF PROSTHETIC HEART VALVE: Chronic | ICD-10-CM

## 2023-04-14 DIAGNOSIS — Z96.659 PRESENCE OF UNSPECIFIED ARTIFICIAL KNEE JOINT: Chronic | ICD-10-CM

## 2023-04-14 DIAGNOSIS — I25.10 ATHEROSCLEROTIC HEART DISEASE OF NATIVE CORONARY ARTERY WITHOUT ANGINA PECTORIS: Chronic | ICD-10-CM

## 2023-04-14 DIAGNOSIS — Z12.11 ENCOUNTER FOR SCREENING FOR MALIGNANT NEOPLASM OF COLON: ICD-10-CM

## 2023-04-14 DIAGNOSIS — Z95.0 PRESENCE OF CARDIAC PACEMAKER: Chronic | ICD-10-CM

## 2023-04-14 DIAGNOSIS — Z98.890 OTHER SPECIFIED POSTPROCEDURAL STATES: Chronic | ICD-10-CM

## 2023-04-14 PROCEDURE — 88305 TISSUE EXAM BY PATHOLOGIST: CPT

## 2023-04-14 PROCEDURE — 88305 TISSUE EXAM BY PATHOLOGIST: CPT | Mod: 26

## 2023-04-14 PROCEDURE — 45385 COLONOSCOPY W/LESION REMOVAL: CPT | Mod: PT

## 2023-05-04 DIAGNOSIS — F41.8 OTHER SPECIFIED ANXIETY DISORDERS: ICD-10-CM

## 2023-05-04 RX ORDER — HYDROMORPHONE HYDROCHLORIDE 2 MG/1
2 TABLET ORAL EVERY 4 HOURS
Refills: 0 | Status: DISCONTINUED | COMMUNITY
Start: 2019-09-20 | End: 2023-05-04

## 2023-05-04 RX ORDER — HYDROCHLOROTHIAZIDE 12.5 MG/1
12.5 CAPSULE ORAL DAILY
Refills: 0 | Status: DISCONTINUED | COMMUNITY
Start: 2019-09-20 | End: 2023-05-04

## 2023-05-16 NOTE — H&P PST ADULT - TEACHING/LEARNING LEARNING PREFERENCES
PRE-OPERATIVE HISTORY    1.  Medical Problems:      Cataract                                                      Osteopenia                                                    Anxiety                                                       2.  Medications:    No current facility-administered medications for this encounter.     Current Outpatient Medications   Medication Sig Dispense Refill   • alendronate (FOSAMAX) 70 MG tablet Take 1 tablet by mouth 1 day a week.     • sertraline (ZOLOFT) 50 MG tablet Take 1 tablet by mouth daily.     • latanoprost (XALATAN) 0.005 % ophthalmic solution Apply to eye daily.     • atorvastatin (LIPITOR) 10 MG tablet          3.  Allergies/Drug Sensitivities:    ALLERGIES:  Patient has no known allergies.      PHYSICAL EXAM    GENERAL: awake, alert and oriented and in no acute distress    LUNGS:  clear to auscultation    CARDIOVASCULAR: regular rate and rhythm    ABDOMEN:  soft, non-tender, nondistended, no hepatosplenomegaly, normal active bowel sounds, and no masses palpated    EXTREMITIES:  no erythema, induration, no evidence of joint effusion, Range of motion of all joints is normal    I have seen the patient and agree with the patient's PCP that the patient is stable for cataract surgery today.    
written material/individual instruction

## 2023-06-08 NOTE — DISCHARGE NOTE ADULT - HAS THE PATIENT RECEIVED THE INFLUENZA VACCINE DURING THIS VISIT
No Bill For Surgical Tray: no Billing Type: Third-Party Bill Expected Date Of Service: 06/08/2023 Performing Laboratory: 0 Yes

## 2023-07-06 ENCOUNTER — APPOINTMENT (OUTPATIENT)
Dept: INTERNAL MEDICINE | Facility: CLINIC | Age: 68
End: 2023-07-06
Payer: MEDICARE

## 2023-07-06 VITALS
OXYGEN SATURATION: 93 % | BODY MASS INDEX: 35.55 KG/M2 | WEIGHT: 240 LBS | HEIGHT: 69 IN | HEART RATE: 67 BPM | TEMPERATURE: 97.8 F

## 2023-07-06 VITALS — SYSTOLIC BLOOD PRESSURE: 132 MMHG | DIASTOLIC BLOOD PRESSURE: 84 MMHG

## 2023-07-06 DIAGNOSIS — S62.101A FRACTURE OF UNSPECIFIED CARPAL BONE, RIGHT WRIST, INITIAL ENCOUNTER FOR CLOSED FRACTURE: ICD-10-CM

## 2023-07-06 DIAGNOSIS — F41.9 ANXIETY DISORDER, UNSPECIFIED: ICD-10-CM

## 2023-07-06 PROCEDURE — 99213 OFFICE O/P EST LOW 20 MIN: CPT

## 2023-07-07 PROBLEM — F41.9 ANXIETY: Status: ACTIVE | Noted: 2023-01-10

## 2023-07-07 PROBLEM — S62.101A WRIST FRACTURE, RIGHT: Status: ACTIVE | Noted: 2023-07-07

## 2023-07-10 NOTE — HISTORY OF PRESENT ILLNESS
[FreeTextEntry1] : 69 yo male patient is present for medication refills. Pt fractured his wrist & 4 ribs after falling. Is still recovering from the injuries.

## 2023-08-01 ENCOUNTER — RX RENEWAL (OUTPATIENT)
Age: 68
End: 2023-08-01

## 2023-10-03 ENCOUNTER — APPOINTMENT (OUTPATIENT)
Dept: INTERNAL MEDICINE | Facility: CLINIC | Age: 68
End: 2023-10-03
Payer: MEDICARE

## 2023-10-03 VITALS
DIASTOLIC BLOOD PRESSURE: 82 MMHG | HEART RATE: 69 BPM | HEIGHT: 69 IN | BODY MASS INDEX: 35.7 KG/M2 | TEMPERATURE: 98 F | WEIGHT: 241 LBS | OXYGEN SATURATION: 96 % | SYSTOLIC BLOOD PRESSURE: 132 MMHG

## 2023-10-03 PROCEDURE — 99213 OFFICE O/P EST LOW 20 MIN: CPT

## 2023-10-03 PROCEDURE — G0009: CPT

## 2023-10-03 PROCEDURE — 90677 PCV20 VACCINE IM: CPT

## 2023-10-03 PROCEDURE — G0008: CPT

## 2023-10-03 PROCEDURE — 90662 IIV NO PRSV INCREASED AG IM: CPT

## 2023-10-06 RX ORDER — ZOLPIDEM TARTRATE 10 MG/1
10 TABLET ORAL
Qty: 30 | Refills: 2 | Status: DISCONTINUED | COMMUNITY
Start: 2023-01-10 | End: 2023-10-06

## 2023-10-30 ENCOUNTER — NON-APPOINTMENT (OUTPATIENT)
Age: 68
End: 2023-10-30

## 2024-01-04 ENCOUNTER — NON-APPOINTMENT (OUTPATIENT)
Age: 69
End: 2024-01-04

## 2024-01-11 ENCOUNTER — APPOINTMENT (OUTPATIENT)
Dept: INTERNAL MEDICINE | Facility: CLINIC | Age: 69
End: 2024-01-11
Payer: MEDICARE

## 2024-01-11 VITALS
DIASTOLIC BLOOD PRESSURE: 82 MMHG | HEIGHT: 69 IN | HEART RATE: 70 BPM | SYSTOLIC BLOOD PRESSURE: 133 MMHG | BODY MASS INDEX: 35.55 KG/M2 | OXYGEN SATURATION: 96 % | TEMPERATURE: 98.2 F | WEIGHT: 240 LBS

## 2024-01-11 DIAGNOSIS — R55 SYNCOPE AND COLLAPSE: ICD-10-CM

## 2024-01-11 DIAGNOSIS — Z95.2 PRESENCE OF PROSTHETIC HEART VALVE: ICD-10-CM

## 2024-01-11 PROCEDURE — 99496 TRANSJ CARE MGMT HIGH F2F 7D: CPT

## 2024-01-11 PROCEDURE — 99214 OFFICE O/P EST MOD 30 MIN: CPT

## 2024-01-11 NOTE — PHYSICAL EXAM
[Normal TMs] : both tympanic membranes were normal [Normal Supraclavicular Nodes] : no supraclavicular lymphadenopathy [No Acute Distress] : no acute distress [Well Nourished] : well nourished [Well Developed] : well developed [Well-Appearing] : well-appearing [Normal Voice/Communication] : normal voice/communication [Normal Sclera/Conjunctiva] : normal sclera/conjunctiva [PERRL] : pupils equal round and reactive to light [EOMI] : extraocular movements intact [Normal Outer Ear/Nose] : the outer ears and nose were normal in appearance [Normal Oropharynx] : the oropharynx was normal [No JVD] : no jugular venous distention [No Lymphadenopathy] : no lymphadenopathy [Supple] : supple [Thyroid Normal, No Nodules] : the thyroid was normal and there were no nodules present [No Respiratory Distress] : no respiratory distress  [No Accessory Muscle Use] : no accessory muscle use [Clear to Auscultation] : lungs were clear to auscultation bilaterally [Normal Rate] : normal rate  [Regular Rhythm] : with a regular rhythm [Normal S1, S2] : normal S1 and S2 [No Murmur] : no murmur heard [No Carotid Bruits] : no carotid bruits [No Abdominal Bruit] : a ~M bruit was not heard ~T in the abdomen [No Varicosities] : no varicosities [Pedal Pulses Present] : the pedal pulses are present [No Edema] : there was no peripheral edema [No Palpable Aorta] : no palpable aorta [No Extremity Clubbing/Cyanosis] : no extremity clubbing/cyanosis [Soft] : abdomen soft [Non Tender] : non-tender [Non-distended] : non-distended [No Masses] : no abdominal mass palpated [No HSM] : no HSM [Normal Bowel Sounds] : normal bowel sounds [Normal Posterior Cervical Nodes] : no posterior cervical lymphadenopathy [Normal Anterior Cervical Nodes] : no anterior cervical lymphadenopathy [No CVA Tenderness] : no CVA  tenderness [No Spinal Tenderness] : no spinal tenderness [No Joint Swelling] : no joint swelling [Grossly Normal Strength/Tone] : grossly normal strength/tone [No Rash] : no rash [Coordination Grossly Intact] : coordination grossly intact [No Focal Deficits] : no focal deficits [Normal Gait] : normal gait [Deep Tendon Reflexes (DTR)] : deep tendon reflexes were 2+ and symmetric [Speech Grossly Normal] : speech grossly normal [Memory Grossly Normal] : memory grossly normal [Normal Affect] : the affect was normal [Alert and Oriented x3] : oriented to person, place, and time [Normal Mood] : the mood was normal [Normal Insight/Judgement] : insight and judgment were intact

## 2024-01-16 NOTE — HISTORY OF PRESENT ILLNESS
[Post-hospitalization from ___ Hospital] : Post-hospitalization from [unfilled] Hospital [Admitted on: ___] : The patient was admitted on [unfilled] [Discharged on ___] : discharged on [unfilled] [FreeTextEntry1] : follow up [de-identified] : TAYLER OCAMPO is a 68 year M who presents today for follow up for medication renewal. Patient has a hx of anxiety. Patient has no new complaints. [FreeTextEntry2] : TAYLER OCAMPO is a 68 year M who presents today for hospital follow up. Patient was admitted to the hospital on 01/07/2024 after he mistakenly ate 5 pills of Levaquin. Patient's kidney function was monitored during his hospital stay. Patient had a CT scan taken in the hospital, and it revealed a mass on his lungs. Patient has a hx of HTN.

## 2024-01-16 NOTE — HISTORY OF PRESENT ILLNESS
[Post-hospitalization from ___ Hospital] : Post-hospitalization from [unfilled] Hospital [Admitted on: ___] : The patient was admitted on [unfilled] [Discharged on ___] : discharged on [unfilled] [FreeTextEntry1] : follow up [de-identified] : TAYLER OCAMPO is a 68 year M who presents today for follow up for medication renewal. Patient has a hx of anxiety. Patient has no new complaints. [FreeTextEntry2] : TAYLER OCAMPO is a 68 year M who presents today for hospital follow up. Patient was admitted to the hospital on 01/07/2024 after he mistakenly ate 5 pills of Levaquin. Patient's kidney function was monitored during his hospital stay. Patient had a CT scan taken in the hospital, and it revealed a mass on his lungs. Patient has a hx of HTN.

## 2024-01-16 NOTE — END OF VISIT
[FreeTextEntry3] :  "I, Ryan Alcantara, personally scribed the services dictated to me by Dr. Bam Becker MD in this documentation on 01/11/2024 "   "I Dr. Bam Becker MD, personally performed the services described in this documentation on 01/11/2024 for the patient as scribed by Ryan Alcantara in my presence. I have reviewed and verified that all the information is accurate and true."

## 2024-01-16 NOTE — PLAN
[FreeTextEntry1] : Continue Present Medications Follow up with pulmonologist Dr. Zabala Follow up in 1-2 months

## 2024-04-11 ENCOUNTER — APPOINTMENT (OUTPATIENT)
Dept: INTERNAL MEDICINE | Facility: CLINIC | Age: 69
End: 2024-04-11
Payer: MEDICARE

## 2024-04-11 VITALS
HEART RATE: 60 BPM | SYSTOLIC BLOOD PRESSURE: 100 MMHG | HEIGHT: 69 IN | OXYGEN SATURATION: 97 % | BODY MASS INDEX: 35.55 KG/M2 | TEMPERATURE: 97.9 F | WEIGHT: 240 LBS | DIASTOLIC BLOOD PRESSURE: 64 MMHG

## 2024-04-11 DIAGNOSIS — I10 ESSENTIAL (PRIMARY) HYPERTENSION: ICD-10-CM

## 2024-04-11 DIAGNOSIS — R91.8 OTHER NONSPECIFIC ABNORMAL FINDING OF LUNG FIELD: ICD-10-CM

## 2024-04-11 DIAGNOSIS — G47.00 INSOMNIA, UNSPECIFIED: ICD-10-CM

## 2024-04-11 PROCEDURE — 99213 OFFICE O/P EST LOW 20 MIN: CPT

## 2024-04-11 RX ORDER — NIRMATRELVIR AND RITONAVIR 300-100 MG
20 X 150 MG & KIT ORAL TWICE DAILY
Qty: 30 | Refills: 0 | Status: DISCONTINUED | COMMUNITY
Start: 2023-01-05 | End: 2024-04-11

## 2024-04-11 RX ORDER — RANOLAZINE 500 MG/1
500 TABLET, FILM COATED, EXTENDED RELEASE ORAL TWICE DAILY
Refills: 0 | Status: ACTIVE | COMMUNITY

## 2024-04-11 RX ORDER — POTASSIUM CHLORIDE 10 MEQ
10 CAPSULE, EXTENDED RELEASE ORAL DAILY
Refills: 0 | Status: ACTIVE | COMMUNITY

## 2024-04-11 RX ORDER — FUROSEMIDE 40 MG/1
40 TABLET ORAL DAILY
Refills: 0 | Status: ACTIVE | COMMUNITY

## 2024-04-11 RX ORDER — SERTRALINE HYDROCHLORIDE 50 MG/1
50 TABLET, FILM COATED ORAL DAILY
Qty: 90 | Refills: 1 | Status: DISCONTINUED | COMMUNITY
Start: 2023-05-04 | End: 2024-04-11

## 2024-04-11 RX ORDER — PANTOPRAZOLE 40 MG/1
40 TABLET, DELAYED RELEASE ORAL DAILY
Qty: 30 | Refills: 0 | Status: DISCONTINUED | COMMUNITY
Start: 2019-09-20 | End: 2024-04-11

## 2024-04-11 RX ORDER — EZETIMIBE 10 MG/1
10 TABLET ORAL DAILY
Refills: 0 | Status: ACTIVE | COMMUNITY

## 2024-04-11 RX ORDER — METOPROLOL SUCCINATE 200 MG/1
200 TABLET, EXTENDED RELEASE ORAL
Refills: 0 | Status: ACTIVE | COMMUNITY
Start: 2024-04-11

## 2024-04-11 RX ORDER — SACUBITRIL AND VALSARTAN 97; 103 MG/1; MG/1
97-103 TABLET, FILM COATED ORAL TWICE DAILY
Refills: 0 | Status: ACTIVE | COMMUNITY

## 2024-04-11 RX ORDER — VALSARTAN 80 MG/1
80 TABLET ORAL DAILY
Refills: 0 | Status: DISCONTINUED | COMMUNITY
Start: 2019-09-20 | End: 2024-04-11

## 2024-04-11 NOTE — END OF VISIT
[FreeTextEntry3] :  "I, Ryan Alcantara, personally scribed the services dictated to me by Dr. Bam Becker MD in this documentation on 04/11/2024 "   "I Dr. Bam Becker MD, personally performed the services described in this documentation on 04/11/2024 for the patient as scribed by Ryan Alcantara in my presence. I have reviewed and verified that all the information is accurate and true."

## 2024-04-11 NOTE — HISTORY OF PRESENT ILLNESS
[FreeTextEntry1] : follow up [de-identified] : TAYLER OCAMPO is a 68 year M who presents today for medication renewal. Patient has a hx of anxiety, aortic valve replacement, and HTN. Patient was recently diagnosed with a malignant mass in his lung and is having surgery to remove it on 05/13/2024 performed by Dr. Ahumada.

## 2024-04-12 RX ORDER — ZOLPIDEM TARTRATE 10 MG/1
10 TABLET ORAL
Qty: 30 | Refills: 2 | Status: ACTIVE | COMMUNITY
Start: 2023-10-06 | End: 1900-01-01

## 2024-04-12 RX ORDER — ALPRAZOLAM 0.5 MG/1
0.5 TABLET ORAL 4 TIMES DAILY
Qty: 120 | Refills: 0 | Status: ACTIVE | COMMUNITY
Start: 2023-01-10 | End: 1900-01-01

## 2024-07-22 ENCOUNTER — APPOINTMENT (OUTPATIENT)
Dept: INTERNAL MEDICINE | Facility: CLINIC | Age: 69
End: 2024-07-22
Payer: MEDICARE

## 2024-07-22 VITALS
HEIGHT: 69 IN | TEMPERATURE: 97.7 F | SYSTOLIC BLOOD PRESSURE: 130 MMHG | OXYGEN SATURATION: 95 % | WEIGHT: 235 LBS | HEART RATE: 73 BPM | DIASTOLIC BLOOD PRESSURE: 80 MMHG | BODY MASS INDEX: 34.8 KG/M2

## 2024-07-22 DIAGNOSIS — R91.8 OTHER NONSPECIFIC ABNORMAL FINDING OF LUNG FIELD: ICD-10-CM

## 2024-07-22 DIAGNOSIS — Z95.2 PRESENCE OF PROSTHETIC HEART VALVE: ICD-10-CM

## 2024-07-22 DIAGNOSIS — Z86.79 PERSONAL HISTORY OF OTHER DISEASES OF THE CIRCULATORY SYSTEM: ICD-10-CM

## 2024-07-22 DIAGNOSIS — I10 ESSENTIAL (PRIMARY) HYPERTENSION: ICD-10-CM

## 2024-07-22 PROCEDURE — 99214 OFFICE O/P EST MOD 30 MIN: CPT

## 2024-07-22 NOTE — RESULTS/DATA
[] : not indicated [de-identified] : nl [de-identified] : BMP nl [de-identified] : Paced rhythm occasional PVC 6/11/24

## 2024-07-22 NOTE — ASSESSMENT
[Patient Optimized for Surgery] : Patient optimized for surgery [No Further Testing Recommended] : no further testing recommended [FreeTextEntry4] : Patient is in optimal condition and is optimized for the procedure

## 2024-07-22 NOTE — HISTORY OF PRESENT ILLNESS
[Aortic Stenosis] : aortic stenosis [Atrial Fibrillation] : atrial fibrillation [Coronary Artery Disease] : coronary artery disease [FreeTextEntry1] : Resection right lung mass [FreeTextEntry2] : 1/31/24 [FreeTextEntry3] : [facility] [FreeTextEntry4] : Cleared for resection of right lung mass he has recently recovered from an upper respiratory infection 10 days ago He also had a TAVR procedure done approximately 6 weeks ago with good results

## 2024-07-22 NOTE — PHYSICAL EXAM
[No Carotid Bruits] : no carotid bruits [No Edema] : there was no peripheral edema [Normal] : no joint swelling and grossly normal strength and tone

## 2024-08-28 ENCOUNTER — NON-APPOINTMENT (OUTPATIENT)
Age: 69
End: 2024-08-28

## 2024-09-24 ENCOUNTER — APPOINTMENT (OUTPATIENT)
Dept: INTERNAL MEDICINE | Facility: CLINIC | Age: 69
End: 2024-09-24
Payer: MEDICARE

## 2024-09-24 VITALS
HEART RATE: 73 BPM | OXYGEN SATURATION: 95 % | RESPIRATION RATE: 17 BRPM | SYSTOLIC BLOOD PRESSURE: 110 MMHG | HEIGHT: 69 IN | BODY MASS INDEX: 35.4 KG/M2 | WEIGHT: 239 LBS | TEMPERATURE: 98.1 F | DIASTOLIC BLOOD PRESSURE: 70 MMHG

## 2024-09-24 DIAGNOSIS — I10 ESSENTIAL (PRIMARY) HYPERTENSION: ICD-10-CM

## 2024-09-24 DIAGNOSIS — Z86.79 PERSONAL HISTORY OF OTHER DISEASES OF THE CIRCULATORY SYSTEM: ICD-10-CM

## 2024-09-24 PROCEDURE — 99213 OFFICE O/P EST LOW 20 MIN: CPT

## 2024-09-24 RX ORDER — RANOLAZINE 500 MG/1
500 TABLET, EXTENDED RELEASE ORAL
Refills: 0 | Status: ACTIVE | COMMUNITY

## 2024-09-24 RX ORDER — SACUBITRIL AND VALSARTAN 24; 26 MG/1; MG/1
24-26 TABLET, FILM COATED ORAL TWICE DAILY
Refills: 0 | Status: ACTIVE | COMMUNITY

## 2024-09-24 RX ORDER — EZETIMIBE 10 MG/1
10 TABLET ORAL DAILY
Refills: 0 | Status: ACTIVE | COMMUNITY

## 2024-09-26 NOTE — HISTORY OF PRESENT ILLNESS
[FreeTextEntry1] : follow up/medication renewal [de-identified] : TAYLER OCAMPO is a 69-year-old M who presents today for a follow up visit. Patient has a hx of depression with anxiety and hypertension. Patient has no new complaints.

## 2024-09-26 NOTE — END OF VISIT
[FreeTextEntry3] :  "I, Adam Grover, personally scribed the services dictated to me by Dr. Christ Becker MD in this documentation on 09/24/2024" "I Dr. Christ Becker MD, personally performed the services described in this documentation on 09/24/2024 for the patient as scribed by Adam Grover in my presence. I have reviewed and verified that all the information is accurate and true."

## 2024-09-26 NOTE — HISTORY OF PRESENT ILLNESS
[FreeTextEntry1] : follow up/medication renewal [de-identified] : TAYLER OCAMPO is a 69-year-old M who presents today for a follow up visit. Patient has a hx of depression with anxiety and hypertension. Patient has no new complaints.

## 2024-12-06 ENCOUNTER — APPOINTMENT (OUTPATIENT)
Dept: FAMILY MEDICINE | Facility: CLINIC | Age: 69
End: 2024-12-06

## 2024-12-06 VITALS
HEART RATE: 84 BPM | TEMPERATURE: 98.5 F | SYSTOLIC BLOOD PRESSURE: 118 MMHG | DIASTOLIC BLOOD PRESSURE: 80 MMHG | BODY MASS INDEX: 36.43 KG/M2 | HEIGHT: 69 IN | OXYGEN SATURATION: 94 % | WEIGHT: 246 LBS

## 2024-12-06 DIAGNOSIS — Z87.19 PERSONAL HISTORY OF OTHER DISEASES OF THE DIGESTIVE SYSTEM: ICD-10-CM

## 2024-12-06 DIAGNOSIS — Z23 ENCOUNTER FOR IMMUNIZATION: ICD-10-CM

## 2024-12-06 DIAGNOSIS — K21.9 GASTRO-ESOPHAGEAL REFLUX DISEASE W/OUT ESOPHAGITIS: ICD-10-CM

## 2024-12-06 DIAGNOSIS — I25.810 ATHEROSCLEROSIS OF CORONARY ARTERY BYPASS GRAFT(S) W/OUT ANGINA PECTORIS: ICD-10-CM

## 2024-12-06 DIAGNOSIS — E78.5 HYPERLIPIDEMIA, UNSPECIFIED: ICD-10-CM

## 2024-12-06 DIAGNOSIS — I10 ESSENTIAL (PRIMARY) HYPERTENSION: ICD-10-CM

## 2024-12-06 DIAGNOSIS — G47.33 OBSTRUCTIVE SLEEP APNEA (ADULT) (PEDIATRIC): ICD-10-CM

## 2024-12-06 DIAGNOSIS — E78.49 OTHER HYPERLIPIDEMIA: ICD-10-CM

## 2024-12-06 DIAGNOSIS — Z86.39 PERSONAL HISTORY OF OTHER ENDOCRINE, NUTRITIONAL AND METABOLIC DISEASE: ICD-10-CM

## 2024-12-06 DIAGNOSIS — R91.8 OTHER NONSPECIFIC ABNORMAL FINDING OF LUNG FIELD: ICD-10-CM

## 2024-12-06 DIAGNOSIS — Z86.79 PERSONAL HISTORY OF OTHER DISEASES OF THE CIRCULATORY SYSTEM: ICD-10-CM

## 2024-12-06 DIAGNOSIS — I48.91 UNSPECIFIED ATRIAL FIBRILLATION: ICD-10-CM

## 2024-12-06 DIAGNOSIS — Z85.46 PERSONAL HISTORY OF MALIGNANT NEOPLASM OF PROSTATE: ICD-10-CM

## 2024-12-06 PROCEDURE — 90662 IIV NO PRSV INCREASED AG IM: CPT

## 2024-12-06 PROCEDURE — G0008: CPT

## 2024-12-06 PROCEDURE — 99204 OFFICE O/P NEW MOD 45 MIN: CPT | Mod: 25

## 2024-12-09 LAB
ALBUMIN SERPL ELPH-MCNC: 4.8 G/DL
ALP BLD-CCNC: 74 U/L
ALT SERPL-CCNC: 22 U/L
ANION GAP SERPL CALC-SCNC: 14 MMOL/L
AST SERPL-CCNC: 22 U/L
BILIRUB SERPL-MCNC: 0.6 MG/DL
BUN SERPL-MCNC: 17 MG/DL
CALCIUM SERPL-MCNC: 10.2 MG/DL
CHLORIDE SERPL-SCNC: 98 MMOL/L
CHOLEST SERPL-MCNC: 108 MG/DL
CO2 SERPL-SCNC: 24 MMOL/L
CREAT SERPL-MCNC: 0.85 MG/DL
EGFR: 93 ML/MIN/1.73M2
ESTIMATED AVERAGE GLUCOSE: 114 MG/DL
GLUCOSE SERPL-MCNC: 121 MG/DL
HBA1C MFR BLD HPLC: 5.6 %
HCT VFR BLD CALC: 43.9 %
HDLC SERPL-MCNC: 34 MG/DL
HGB BLD-MCNC: 15.2 G/DL
LDLC SERPL CALC-MCNC: 52 MG/DL
MCHC RBC-ENTMCNC: 33.3 PG
MCHC RBC-ENTMCNC: 34.6 G/DL
MCV RBC AUTO: 96.1 FL
NONHDLC SERPL-MCNC: 74 MG/DL
PLATELET # BLD AUTO: 196 K/UL
POTASSIUM SERPL-SCNC: 4.6 MMOL/L
PROT SERPL-MCNC: 7.8 G/DL
RBC # BLD: 4.57 M/UL
RBC # FLD: 12.4 %
SODIUM SERPL-SCNC: 136 MMOL/L
TRIGL SERPL-MCNC: 121 MG/DL
TSH SERPL-ACNC: 2.11 UIU/ML
WBC # FLD AUTO: 8.67 K/UL

## 2025-01-07 NOTE — ASU PREOP CHECKLIST - WARM FLUIDS/WARM BLANKETS
Name of Medication(s) Requested:  Requested Prescriptions     Pending Prescriptions Disp Refills    temazepam (RESTORIL) 15 MG capsule 30 capsule 0     Sig: Take 1 capsule by mouth nightly as needed for Sleep for up to 30 days. Max Daily Amount: 15 mg       Medication is on current medication list Yes    Dosage and directions were verified? Yes    Quantity verified: 30 day supply     Pharmacy Verified?  Yes    Last Appointment:  11/6/2024    Future appts:  Future Appointments   Date Time Provider Department Center   4/15/2025  1:20 PM Summer Varma MD [facility]   5/14/2025  8:15 AM Jes Souza PA-C [facility]   9/16/2025  2:00 PM Summer Varma MD [facility] 2 [facility]        (If no appt send self scheduling link. .[email or web address])  Scheduling request sent?     [] Yes  [] No    Does patient need updated?  [] Yes  [] No  
yes

## 2025-06-27 ENCOUNTER — NON-APPOINTMENT (OUTPATIENT)
Age: 70
End: 2025-06-27

## 2025-09-15 ENCOUNTER — APPOINTMENT (OUTPATIENT)
Dept: FAMILY MEDICINE | Facility: CLINIC | Age: 70
End: 2025-09-15
Payer: MEDICARE

## 2025-09-15 VITALS
DIASTOLIC BLOOD PRESSURE: 82 MMHG | HEART RATE: 85 BPM | OXYGEN SATURATION: 94 % | SYSTOLIC BLOOD PRESSURE: 114 MMHG | HEIGHT: 69 IN | WEIGHT: 245 LBS | TEMPERATURE: 97.3 F | BODY MASS INDEX: 36.29 KG/M2

## 2025-09-15 DIAGNOSIS — F41.8 OTHER SPECIFIED ANXIETY DISORDERS: ICD-10-CM

## 2025-09-15 DIAGNOSIS — F41.9 ANXIETY DISORDER, UNSPECIFIED: ICD-10-CM

## 2025-09-15 DIAGNOSIS — G47.00 INSOMNIA, UNSPECIFIED: ICD-10-CM

## 2025-09-15 DIAGNOSIS — Z23 ENCOUNTER FOR IMMUNIZATION: ICD-10-CM

## 2025-09-15 PROCEDURE — G2211 COMPLEX E/M VISIT ADD ON: CPT | Mod: GC

## 2025-09-15 PROCEDURE — 99214 OFFICE O/P EST MOD 30 MIN: CPT | Mod: GC,25

## 2025-09-15 PROCEDURE — 90662 IIV NO PRSV INCREASED AG IM: CPT | Mod: GC

## 2025-09-15 PROCEDURE — G0008: CPT | Mod: GC

## 2025-09-15 RX ORDER — METOPROLOL SUCCINATE 50 MG/1
50 TABLET, EXTENDED RELEASE ORAL DAILY
Refills: 0 | Status: ACTIVE | COMMUNITY
Start: 2025-09-15

## (undated) DEVICE — TUBING IV SET GRAVITY 3Y 100" MACRO

## (undated) DEVICE — SYR LUER SLIP TIP 50CC

## (undated) DEVICE — SYR IV POSIFLUSH NS 3ML 30/TY

## (undated) DEVICE — TRAP QUICK CATCH  SINGL CHAMBER

## (undated) DEVICE — RETRIEVER ROTH NET PLATINUM-UNIVERSAL

## (undated) DEVICE — ELCTR GROUNDING PAD ADULT COVIDIEN

## (undated) DEVICE — CATH IV SAFE BC 20G X 1.16" (PINK)

## (undated) DEVICE — TRAP SPECIMEN SPUTUM 40CC

## (undated) DEVICE — ENDOCUFF VISION SZ 2 LG GRN

## (undated) DEVICE — FORCEP RADIAL JAW 4 W NDL 2.4MM 2.8MM 240CM ORANGE DISP

## (undated) DEVICE — CLAMP BX HOT RAD JAW 3

## (undated) DEVICE — NDL INJ SCLERO INTERJECT 23G

## (undated) DEVICE — PACK IV START WITH CHG

## (undated) DEVICE — TUBE O2 SUPL CRUSH RESIS CONN SOUTHSIDE ONLY

## (undated) DEVICE — Device

## (undated) DEVICE — SOL IRR POUR H2O 500ML

## (undated) DEVICE — MASK OXYGEN PANORAMIC

## (undated) DEVICE — ENDOCUFF VISION SZ 3 SM PRPL

## (undated) DEVICE — POLY TRAP ETRAP

## (undated) DEVICE — SUCTION YANKAUER TAPERED BULBOUS NO VENT

## (undated) DEVICE — CATH ELCTR GLIDE PRB 7FR

## (undated) DEVICE — STERIS DEFENDO 3-PIECE KIT (AIR/WATER, SUCTION & BIOPSY VALVES)

## (undated) DEVICE — SOL IRR NS 0.9% 250ML

## (undated) DEVICE — FORMALIN CUPS 10% BUFFERED

## (undated) DEVICE — BRUSH COLONOSCOPY CYTOLOGY

## (undated) DEVICE — SENSOR O2 FINGER XL ADULT 24/BX 6BX/CA

## (undated) DEVICE — VALVE BIOPSY

## (undated) DEVICE — FORCEP RADIAL JAW 4 JUMBO 2.8MM 3.2MM 240CM ORANGE DISP

## (undated) DEVICE — CANISTER SUCTION 1200CC 10/SL

## (undated) DEVICE — SET IV PUMP BLOOD 1VALVE 180FILTER NON-DEHP

## (undated) DEVICE — SUT HEWSON RETRIEVER

## (undated) DEVICE — CATH IV SAFE BC 22G X 1" (BLUE)

## (undated) DEVICE — SYR LUER SLIP TIP 30CC

## (undated) DEVICE — TUBING SUCTION CONN 6FT STERILE

## (undated) DEVICE — TUBE RECTAL 24FR

## (undated) DEVICE — MASK O2 NON REBREATH 3IN1 ADULT

## (undated) DEVICE — SNARE LRG

## (undated) DEVICE — CATH ELECHMSTAT  INJ 7FR 210CM

## (undated) DEVICE — SNARE POLYP SENS 27MM 240CM

## (undated) DEVICE — MARKER ENDO SPOT EX

## (undated) DEVICE — TUBING IV SET SECONDARY 34"

## (undated) DEVICE — TUBING CANNULA SALTER LABS NASAL ADULT 7FT